# Patient Record
Sex: MALE | Race: WHITE | Employment: OTHER | URBAN - METROPOLITAN AREA
[De-identification: names, ages, dates, MRNs, and addresses within clinical notes are randomized per-mention and may not be internally consistent; named-entity substitution may affect disease eponyms.]

---

## 2017-02-08 ENCOUNTER — OFFICE VISIT (OUTPATIENT)
Dept: INTERNAL MEDICINE CLINIC | Age: 72
End: 2017-02-08

## 2017-02-08 VITALS
WEIGHT: 182.8 LBS | OXYGEN SATURATION: 98 % | TEMPERATURE: 98 F | SYSTOLIC BLOOD PRESSURE: 138 MMHG | HEART RATE: 51 BPM | BODY MASS INDEX: 27.71 KG/M2 | DIASTOLIC BLOOD PRESSURE: 78 MMHG | HEIGHT: 68 IN

## 2017-02-08 DIAGNOSIS — J40 BRONCHITIS: Primary | ICD-10-CM

## 2017-02-08 DIAGNOSIS — J44.9 CHRONIC OBSTRUCTIVE PULMONARY DISEASE, UNSPECIFIED COPD TYPE (HCC): ICD-10-CM

## 2017-02-08 PROCEDURE — G8926 SPIRO NO PERF OR DOC: HCPCS | Performed by: INTERNAL MEDICINE

## 2017-02-08 PROCEDURE — 1036F TOBACCO NON-USER: CPT | Performed by: INTERNAL MEDICINE

## 2017-02-08 PROCEDURE — 1123F ACP DISCUSS/DSCN MKR DOCD: CPT | Performed by: INTERNAL MEDICINE

## 2017-02-08 PROCEDURE — G8427 DOCREV CUR MEDS BY ELIG CLIN: HCPCS | Performed by: INTERNAL MEDICINE

## 2017-02-08 PROCEDURE — G8420 CALC BMI NORM PARAMETERS: HCPCS | Performed by: INTERNAL MEDICINE

## 2017-02-08 PROCEDURE — 99213 OFFICE O/P EST LOW 20 MIN: CPT | Performed by: INTERNAL MEDICINE

## 2017-02-08 PROCEDURE — 4040F PNEUMOC VAC/ADMIN/RCVD: CPT | Performed by: INTERNAL MEDICINE

## 2017-02-08 PROCEDURE — 3017F COLORECTAL CA SCREEN DOC REV: CPT | Performed by: INTERNAL MEDICINE

## 2017-02-08 PROCEDURE — G8484 FLU IMMUNIZE NO ADMIN: HCPCS | Performed by: INTERNAL MEDICINE

## 2017-02-08 PROCEDURE — G8598 ASA/ANTIPLAT THER USED: HCPCS | Performed by: INTERNAL MEDICINE

## 2017-02-08 PROCEDURE — 3023F SPIROM DOC REV: CPT | Performed by: INTERNAL MEDICINE

## 2017-02-08 RX ORDER — METHYLPREDNISOLONE 4 MG/1
TABLET ORAL
Qty: 1 KIT | Refills: 0 | Status: SHIPPED | OUTPATIENT
Start: 2017-02-08 | End: 2017-02-28

## 2017-02-08 RX ORDER — CEFUROXIME AXETIL 250 MG/1
250 TABLET ORAL 2 TIMES DAILY
Qty: 20 TABLET | Refills: 0 | Status: SHIPPED | OUTPATIENT
Start: 2017-02-08 | End: 2017-02-18

## 2017-02-08 ASSESSMENT — ENCOUNTER SYMPTOMS
SINUS PRESSURE: 1
COUGH: 1
EYES NEGATIVE: 1

## 2017-02-13 ENCOUNTER — TELEPHONE (OUTPATIENT)
Dept: INTERNAL MEDICINE CLINIC | Age: 72
End: 2017-02-13

## 2017-02-13 ENCOUNTER — OFFICE VISIT (OUTPATIENT)
Dept: INTERNAL MEDICINE CLINIC | Age: 72
End: 2017-02-13

## 2017-02-13 VITALS
BODY MASS INDEX: 26.37 KG/M2 | HEIGHT: 68 IN | WEIGHT: 174 LBS | TEMPERATURE: 97.9 F | OXYGEN SATURATION: 97 % | SYSTOLIC BLOOD PRESSURE: 130 MMHG | HEART RATE: 79 BPM | DIASTOLIC BLOOD PRESSURE: 80 MMHG

## 2017-02-13 DIAGNOSIS — I10 ESSENTIAL HYPERTENSION: ICD-10-CM

## 2017-02-13 DIAGNOSIS — R19.7 DIARRHEA, UNSPECIFIED TYPE: Primary | ICD-10-CM

## 2017-02-13 DIAGNOSIS — J40 BRONCHITIS: ICD-10-CM

## 2017-02-13 PROCEDURE — 3017F COLORECTAL CA SCREEN DOC REV: CPT | Performed by: INTERNAL MEDICINE

## 2017-02-13 PROCEDURE — G8598 ASA/ANTIPLAT THER USED: HCPCS | Performed by: INTERNAL MEDICINE

## 2017-02-13 PROCEDURE — G8427 DOCREV CUR MEDS BY ELIG CLIN: HCPCS | Performed by: INTERNAL MEDICINE

## 2017-02-13 PROCEDURE — 4040F PNEUMOC VAC/ADMIN/RCVD: CPT | Performed by: INTERNAL MEDICINE

## 2017-02-13 PROCEDURE — 1036F TOBACCO NON-USER: CPT | Performed by: INTERNAL MEDICINE

## 2017-02-13 PROCEDURE — G8484 FLU IMMUNIZE NO ADMIN: HCPCS | Performed by: INTERNAL MEDICINE

## 2017-02-13 PROCEDURE — G8420 CALC BMI NORM PARAMETERS: HCPCS | Performed by: INTERNAL MEDICINE

## 2017-02-13 PROCEDURE — 99213 OFFICE O/P EST LOW 20 MIN: CPT | Performed by: INTERNAL MEDICINE

## 2017-02-13 PROCEDURE — 1123F ACP DISCUSS/DSCN MKR DOCD: CPT | Performed by: INTERNAL MEDICINE

## 2017-02-13 ASSESSMENT — ENCOUNTER SYMPTOMS: COUGH: 1

## 2017-02-14 ENCOUNTER — TELEPHONE (OUTPATIENT)
Dept: INTERNAL MEDICINE CLINIC | Age: 72
End: 2017-02-14

## 2017-02-20 ENCOUNTER — CARE COORDINATION (OUTPATIENT)
Dept: INTERNAL MEDICINE CLINIC | Age: 72
End: 2017-02-20

## 2017-02-21 ENCOUNTER — TELEPHONE (OUTPATIENT)
Dept: INTERNAL MEDICINE CLINIC | Age: 72
End: 2017-02-21

## 2017-02-22 ENCOUNTER — CARE COORDINATION (OUTPATIENT)
Dept: INTERNAL MEDICINE CLINIC | Age: 72
End: 2017-02-22

## 2017-02-23 ENCOUNTER — CARE COORDINATION (OUTPATIENT)
Dept: INTERNAL MEDICINE CLINIC | Age: 72
End: 2017-02-23

## 2017-02-28 ENCOUNTER — OFFICE VISIT (OUTPATIENT)
Dept: INTERNAL MEDICINE CLINIC | Age: 72
End: 2017-02-28

## 2017-02-28 ENCOUNTER — HOSPITAL ENCOUNTER (OUTPATIENT)
Dept: VASCULAR LAB | Age: 72
Discharge: HOME | End: 2017-02-28
Attending: INTERNAL MEDICINE | Admitting: INTERNAL MEDICINE

## 2017-02-28 VITALS
HEIGHT: 68 IN | RESPIRATION RATE: 16 BRPM | SYSTOLIC BLOOD PRESSURE: 140 MMHG | HEART RATE: 62 BPM | DIASTOLIC BLOOD PRESSURE: 70 MMHG | WEIGHT: 181.2 LBS | BODY MASS INDEX: 27.46 KG/M2

## 2017-02-28 DIAGNOSIS — I82.812 SAPHENOUS VEIN CLOT, LEFT: ICD-10-CM

## 2017-02-28 DIAGNOSIS — M79.605 LEFT LEG PAIN: Primary | ICD-10-CM

## 2017-02-28 DIAGNOSIS — M79.605 PAIN OF LEFT LEG: ICD-10-CM

## 2017-02-28 LAB
BASOPHILS ABSOLUTE: 0 K/UL (ref 0–0.2)
BASOPHILS RELATIVE PERCENT: 0.6 %
EOSINOPHILS ABSOLUTE: 0.3 K/UL (ref 0–0.6)
EOSINOPHILS RELATIVE PERCENT: 3.9 %
HCT VFR BLD CALC: 41.2 % (ref 40.5–52.5)
HEMOGLOBIN: 13.7 G/DL (ref 13.5–17.5)
LYMPHOCYTES ABSOLUTE: 1.8 K/UL (ref 1–5.1)
LYMPHOCYTES RELATIVE PERCENT: 25.2 %
MCH RBC QN AUTO: 30.1 PG (ref 26–34)
MCHC RBC AUTO-ENTMCNC: 33.2 G/DL (ref 31–36)
MCV RBC AUTO: 90.7 FL (ref 80–100)
MONOCYTES ABSOLUTE: 0.5 K/UL (ref 0–1.3)
MONOCYTES RELATIVE PERCENT: 7.6 %
NEUTROPHILS ABSOLUTE: 4.5 K/UL (ref 1.7–7.7)
NEUTROPHILS RELATIVE PERCENT: 62.7 %
PDW BLD-RTO: 13.6 % (ref 12.4–15.4)
PLATELET # BLD: 216 K/UL (ref 135–450)
PMV BLD AUTO: 9.3 FL (ref 5–10.5)
RBC # BLD: 4.55 M/UL (ref 4.2–5.9)
WBC # BLD: 7.2 K/UL (ref 4–11)

## 2017-02-28 PROCEDURE — G8420 CALC BMI NORM PARAMETERS: HCPCS | Performed by: INTERNAL MEDICINE

## 2017-02-28 PROCEDURE — 1036F TOBACCO NON-USER: CPT | Performed by: INTERNAL MEDICINE

## 2017-02-28 PROCEDURE — G8598 ASA/ANTIPLAT THER USED: HCPCS | Performed by: INTERNAL MEDICINE

## 2017-02-28 PROCEDURE — 99214 OFFICE O/P EST MOD 30 MIN: CPT | Performed by: INTERNAL MEDICINE

## 2017-02-28 PROCEDURE — G8484 FLU IMMUNIZE NO ADMIN: HCPCS | Performed by: INTERNAL MEDICINE

## 2017-02-28 PROCEDURE — G8427 DOCREV CUR MEDS BY ELIG CLIN: HCPCS | Performed by: INTERNAL MEDICINE

## 2017-02-28 PROCEDURE — 1123F ACP DISCUSS/DSCN MKR DOCD: CPT | Performed by: INTERNAL MEDICINE

## 2017-02-28 PROCEDURE — 3017F COLORECTAL CA SCREEN DOC REV: CPT | Performed by: INTERNAL MEDICINE

## 2017-02-28 PROCEDURE — 4040F PNEUMOC VAC/ADMIN/RCVD: CPT | Performed by: INTERNAL MEDICINE

## 2017-02-28 ASSESSMENT — PATIENT HEALTH QUESTIONNAIRE - PHQ9
SUM OF ALL RESPONSES TO PHQ9 QUESTIONS 1 & 2: 0
1. LITTLE INTEREST OR PLEASURE IN DOING THINGS: 0
2. FEELING DOWN, DEPRESSED OR HOPELESS: 0
SUM OF ALL RESPONSES TO PHQ QUESTIONS 1-9: 0

## 2017-03-01 LAB
A/G RATIO: 1.6 (ref 1.1–2.2)
ALBUMIN SERPL-MCNC: 4 G/DL (ref 3.4–5)
ALP BLD-CCNC: 117 U/L (ref 40–129)
ALT SERPL-CCNC: 21 U/L (ref 10–40)
ANION GAP SERPL CALCULATED.3IONS-SCNC: 16 MMOL/L (ref 3–16)
AST SERPL-CCNC: 15 U/L (ref 15–37)
BILIRUB SERPL-MCNC: 0.6 MG/DL (ref 0–1)
BUN BLDV-MCNC: 11 MG/DL (ref 7–20)
CALCIUM SERPL-MCNC: 9.5 MG/DL (ref 8.3–10.6)
CHLORIDE BLD-SCNC: 102 MMOL/L (ref 99–110)
CO2: 24 MMOL/L (ref 21–32)
CREAT SERPL-MCNC: 0.6 MG/DL (ref 0.8–1.3)
GFR AFRICAN AMERICAN: >60
GFR NON-AFRICAN AMERICAN: >60
GLOBULIN: 2.5 G/DL
GLUCOSE BLD-MCNC: 93 MG/DL (ref 70–99)
POTASSIUM SERPL-SCNC: 4.9 MMOL/L (ref 3.5–5.1)
SODIUM BLD-SCNC: 142 MMOL/L (ref 136–145)
TOTAL PROTEIN: 6.5 G/DL (ref 6.4–8.2)

## 2017-03-24 ENCOUNTER — OFFICE VISIT (OUTPATIENT)
Dept: INTERNAL MEDICINE CLINIC | Age: 72
End: 2017-03-24

## 2017-03-24 VITALS
SYSTOLIC BLOOD PRESSURE: 110 MMHG | DIASTOLIC BLOOD PRESSURE: 60 MMHG | BODY MASS INDEX: 27.89 KG/M2 | HEIGHT: 68 IN | WEIGHT: 184 LBS | HEART RATE: 52 BPM | RESPIRATION RATE: 16 BRPM

## 2017-03-24 DIAGNOSIS — J44.9 CHRONIC OBSTRUCTIVE PULMONARY DISEASE, UNSPECIFIED COPD TYPE (HCC): ICD-10-CM

## 2017-03-24 DIAGNOSIS — I82.90 VENOUS THROMBOSIS OF LEG: ICD-10-CM

## 2017-03-24 DIAGNOSIS — I10 ESSENTIAL HYPERTENSION: ICD-10-CM

## 2017-03-24 DIAGNOSIS — H26.9 RIGHT CATARACT: Primary | ICD-10-CM

## 2017-03-24 DIAGNOSIS — I25.10 CORONARY ARTERY DISEASE INVOLVING NATIVE CORONARY ARTERY OF NATIVE HEART WITHOUT ANGINA PECTORIS: ICD-10-CM

## 2017-03-24 DIAGNOSIS — E78.00 PURE HYPERCHOLESTEROLEMIA: ICD-10-CM

## 2017-03-24 DIAGNOSIS — Z01.818 PREOP EXAMINATION: ICD-10-CM

## 2017-03-24 PROCEDURE — 1036F TOBACCO NON-USER: CPT | Performed by: INTERNAL MEDICINE

## 2017-03-24 PROCEDURE — 93000 ELECTROCARDIOGRAM COMPLETE: CPT | Performed by: INTERNAL MEDICINE

## 2017-03-24 PROCEDURE — 1123F ACP DISCUSS/DSCN MKR DOCD: CPT | Performed by: INTERNAL MEDICINE

## 2017-03-24 PROCEDURE — G8427 DOCREV CUR MEDS BY ELIG CLIN: HCPCS | Performed by: INTERNAL MEDICINE

## 2017-03-24 PROCEDURE — G8484 FLU IMMUNIZE NO ADMIN: HCPCS | Performed by: INTERNAL MEDICINE

## 2017-03-24 PROCEDURE — 99214 OFFICE O/P EST MOD 30 MIN: CPT | Performed by: INTERNAL MEDICINE

## 2017-03-24 PROCEDURE — G8598 ASA/ANTIPLAT THER USED: HCPCS | Performed by: INTERNAL MEDICINE

## 2017-03-24 PROCEDURE — G8420 CALC BMI NORM PARAMETERS: HCPCS | Performed by: INTERNAL MEDICINE

## 2017-03-24 PROCEDURE — G8926 SPIRO NO PERF OR DOC: HCPCS | Performed by: INTERNAL MEDICINE

## 2017-03-24 PROCEDURE — 3017F COLORECTAL CA SCREEN DOC REV: CPT | Performed by: INTERNAL MEDICINE

## 2017-03-24 PROCEDURE — 4040F PNEUMOC VAC/ADMIN/RCVD: CPT | Performed by: INTERNAL MEDICINE

## 2017-03-24 PROCEDURE — 3023F SPIROM DOC REV: CPT | Performed by: INTERNAL MEDICINE

## 2017-03-24 ASSESSMENT — ENCOUNTER SYMPTOMS
GASTROINTESTINAL NEGATIVE: 1
SHORTNESS OF BREATH: 0
RESPIRATORY NEGATIVE: 1
WHEEZING: 0
EYE PAIN: 0
PHOTOPHOBIA: 0
BLOOD IN STOOL: 0

## 2017-04-24 ENCOUNTER — OFFICE VISIT (OUTPATIENT)
Dept: INTERNAL MEDICINE CLINIC | Age: 72
End: 2017-04-24

## 2017-04-24 VITALS
RESPIRATION RATE: 16 BRPM | SYSTOLIC BLOOD PRESSURE: 118 MMHG | BODY MASS INDEX: 28.01 KG/M2 | HEIGHT: 68 IN | WEIGHT: 184.8 LBS | HEART RATE: 52 BPM | DIASTOLIC BLOOD PRESSURE: 70 MMHG

## 2017-04-24 DIAGNOSIS — I10 ESSENTIAL HYPERTENSION: ICD-10-CM

## 2017-04-24 DIAGNOSIS — I25.10 CORONARY ARTERY DISEASE INVOLVING NATIVE CORONARY ARTERY OF NATIVE HEART WITHOUT ANGINA PECTORIS: ICD-10-CM

## 2017-04-24 DIAGNOSIS — Z01.818 PREOP EXAM FOR INTERNAL MEDICINE: ICD-10-CM

## 2017-04-24 DIAGNOSIS — I82.812 SAPHENOUS VEIN CLOT, LEFT: ICD-10-CM

## 2017-04-24 DIAGNOSIS — H26.9 RIGHT CATARACT: Primary | ICD-10-CM

## 2017-04-24 LAB
BASOPHILS ABSOLUTE: 0 K/UL (ref 0–0.2)
BASOPHILS RELATIVE PERCENT: 0.8 %
EOSINOPHILS ABSOLUTE: 0.3 K/UL (ref 0–0.6)
EOSINOPHILS RELATIVE PERCENT: 4.6 %
HCT VFR BLD CALC: 41 % (ref 40.5–52.5)
HEMOGLOBIN: 13.6 G/DL (ref 13.5–17.5)
LYMPHOCYTES ABSOLUTE: 1.9 K/UL (ref 1–5.1)
LYMPHOCYTES RELATIVE PERCENT: 29.7 %
MCH RBC QN AUTO: 30 PG (ref 26–34)
MCHC RBC AUTO-ENTMCNC: 33 G/DL (ref 31–36)
MCV RBC AUTO: 90.6 FL (ref 80–100)
MONOCYTES ABSOLUTE: 0.4 K/UL (ref 0–1.3)
MONOCYTES RELATIVE PERCENT: 6.5 %
NEUTROPHILS ABSOLUTE: 3.6 K/UL (ref 1.7–7.7)
NEUTROPHILS RELATIVE PERCENT: 58.4 %
PDW BLD-RTO: 14.5 % (ref 12.4–15.4)
PLATELET # BLD: 163 K/UL (ref 135–450)
PMV BLD AUTO: 9.8 FL (ref 5–10.5)
RBC # BLD: 4.52 M/UL (ref 4.2–5.9)
WBC # BLD: 6.2 K/UL (ref 4–11)

## 2017-04-24 PROCEDURE — 4040F PNEUMOC VAC/ADMIN/RCVD: CPT | Performed by: INTERNAL MEDICINE

## 2017-04-24 PROCEDURE — G8427 DOCREV CUR MEDS BY ELIG CLIN: HCPCS | Performed by: INTERNAL MEDICINE

## 2017-04-24 PROCEDURE — G8598 ASA/ANTIPLAT THER USED: HCPCS | Performed by: INTERNAL MEDICINE

## 2017-04-24 PROCEDURE — G8420 CALC BMI NORM PARAMETERS: HCPCS | Performed by: INTERNAL MEDICINE

## 2017-04-24 PROCEDURE — 1123F ACP DISCUSS/DSCN MKR DOCD: CPT | Performed by: INTERNAL MEDICINE

## 2017-04-24 PROCEDURE — 1036F TOBACCO NON-USER: CPT | Performed by: INTERNAL MEDICINE

## 2017-04-24 PROCEDURE — 3017F COLORECTAL CA SCREEN DOC REV: CPT | Performed by: INTERNAL MEDICINE

## 2017-04-24 PROCEDURE — 99214 OFFICE O/P EST MOD 30 MIN: CPT | Performed by: INTERNAL MEDICINE

## 2017-05-01 RX ORDER — METOPROLOL SUCCINATE 50 MG/1
TABLET, EXTENDED RELEASE ORAL
Qty: 90 TABLET | Refills: 2 | Status: SHIPPED | OUTPATIENT
Start: 2017-05-01 | End: 2018-01-27 | Stop reason: SDUPTHER

## 2017-06-26 ENCOUNTER — OFFICE VISIT (OUTPATIENT)
Dept: INTERNAL MEDICINE CLINIC | Age: 72
End: 2017-06-26

## 2017-06-26 VITALS
HEART RATE: 72 BPM | SYSTOLIC BLOOD PRESSURE: 120 MMHG | RESPIRATION RATE: 16 BRPM | HEIGHT: 68 IN | BODY MASS INDEX: 27.13 KG/M2 | WEIGHT: 179 LBS | DIASTOLIC BLOOD PRESSURE: 70 MMHG

## 2017-06-26 DIAGNOSIS — I82.812 SAPHENOUS VEIN CLOT, LEFT: Primary | ICD-10-CM

## 2017-06-26 PROCEDURE — 1123F ACP DISCUSS/DSCN MKR DOCD: CPT | Performed by: INTERNAL MEDICINE

## 2017-06-26 PROCEDURE — 1036F TOBACCO NON-USER: CPT | Performed by: INTERNAL MEDICINE

## 2017-06-26 PROCEDURE — G8419 CALC BMI OUT NRM PARAM NOF/U: HCPCS | Performed by: INTERNAL MEDICINE

## 2017-06-26 PROCEDURE — 4040F PNEUMOC VAC/ADMIN/RCVD: CPT | Performed by: INTERNAL MEDICINE

## 2017-06-26 PROCEDURE — G8427 DOCREV CUR MEDS BY ELIG CLIN: HCPCS | Performed by: INTERNAL MEDICINE

## 2017-06-26 PROCEDURE — 99213 OFFICE O/P EST LOW 20 MIN: CPT | Performed by: INTERNAL MEDICINE

## 2017-06-26 PROCEDURE — 3017F COLORECTAL CA SCREEN DOC REV: CPT | Performed by: INTERNAL MEDICINE

## 2017-06-26 PROCEDURE — G8598 ASA/ANTIPLAT THER USED: HCPCS | Performed by: INTERNAL MEDICINE

## 2017-06-26 RX ORDER — SILDENAFIL 100 MG/1
TABLET, FILM COATED ORAL
Qty: 10 TABLET | Refills: 10 | Status: SHIPPED | OUTPATIENT
Start: 2017-06-26 | End: 2018-04-12

## 2017-10-10 ENCOUNTER — OFFICE VISIT (OUTPATIENT)
Dept: INTERNAL MEDICINE CLINIC | Age: 72
End: 2017-10-10

## 2017-10-10 VITALS
DIASTOLIC BLOOD PRESSURE: 70 MMHG | SYSTOLIC BLOOD PRESSURE: 110 MMHG | WEIGHT: 178 LBS | RESPIRATION RATE: 16 BRPM | HEART RATE: 58 BPM | HEIGHT: 68 IN | BODY MASS INDEX: 26.98 KG/M2

## 2017-10-10 DIAGNOSIS — E78.00 PURE HYPERCHOLESTEROLEMIA: ICD-10-CM

## 2017-10-10 DIAGNOSIS — R73.9 HYPERGLYCEMIA: ICD-10-CM

## 2017-10-10 DIAGNOSIS — I25.10 CORONARY ARTERY DISEASE INVOLVING NATIVE CORONARY ARTERY OF NATIVE HEART WITHOUT ANGINA PECTORIS: ICD-10-CM

## 2017-10-10 DIAGNOSIS — J44.1 CHRONIC OBSTRUCTIVE PULMONARY DISEASE WITH ACUTE EXACERBATION (HCC): ICD-10-CM

## 2017-10-10 DIAGNOSIS — I10 ESSENTIAL HYPERTENSION: Primary | ICD-10-CM

## 2017-10-10 PROCEDURE — 3017F COLORECTAL CA SCREEN DOC REV: CPT | Performed by: INTERNAL MEDICINE

## 2017-10-10 PROCEDURE — 3023F SPIROM DOC REV: CPT | Performed by: INTERNAL MEDICINE

## 2017-10-10 PROCEDURE — 4040F PNEUMOC VAC/ADMIN/RCVD: CPT | Performed by: INTERNAL MEDICINE

## 2017-10-10 PROCEDURE — 99214 OFFICE O/P EST MOD 30 MIN: CPT | Performed by: INTERNAL MEDICINE

## 2017-10-10 PROCEDURE — 1036F TOBACCO NON-USER: CPT | Performed by: INTERNAL MEDICINE

## 2017-10-10 PROCEDURE — G8598 ASA/ANTIPLAT THER USED: HCPCS | Performed by: INTERNAL MEDICINE

## 2017-10-10 PROCEDURE — G8427 DOCREV CUR MEDS BY ELIG CLIN: HCPCS | Performed by: INTERNAL MEDICINE

## 2017-10-10 PROCEDURE — G8484 FLU IMMUNIZE NO ADMIN: HCPCS | Performed by: INTERNAL MEDICINE

## 2017-10-10 PROCEDURE — 1123F ACP DISCUSS/DSCN MKR DOCD: CPT | Performed by: INTERNAL MEDICINE

## 2017-10-10 PROCEDURE — G8417 CALC BMI ABV UP PARAM F/U: HCPCS | Performed by: INTERNAL MEDICINE

## 2017-10-10 PROCEDURE — G8926 SPIRO NO PERF OR DOC: HCPCS | Performed by: INTERNAL MEDICINE

## 2017-10-10 RX ORDER — PREDNISONE 10 MG/1
TABLET ORAL
Qty: 22 TABLET | Refills: 0 | Status: SHIPPED | OUTPATIENT
Start: 2017-10-10 | End: 2018-04-12

## 2017-10-10 RX ORDER — ALBUTEROL SULFATE 90 UG/1
2 AEROSOL, METERED RESPIRATORY (INHALATION) EVERY 4 HOURS PRN
Qty: 1 INHALER | Refills: 1 | Status: SHIPPED | OUTPATIENT
Start: 2017-10-10 | End: 2018-04-12

## 2017-10-10 RX ORDER — DOXYCYCLINE HYCLATE 100 MG
100 TABLET ORAL 2 TIMES DAILY
Qty: 14 TABLET | Refills: 0 | Status: SHIPPED | OUTPATIENT
Start: 2017-10-10 | End: 2017-10-17

## 2017-10-10 ASSESSMENT — ENCOUNTER SYMPTOMS
WHEEZING: 1
SHORTNESS OF BREATH: 1
COUGH: 1
GASTROINTESTINAL NEGATIVE: 1

## 2017-10-10 NOTE — PROGRESS NOTES
Subjective:      Patient ID: Vanita Goodwin is a 81 y.o. male. HPI  Here for routine f/u of his htn and CAD. He has overall been doing well, but never had the doppler of his leg to reevaluate the saphenous vein clot. Has developed a cough with wheezing in the last week, may have had  A fever last night. 1. Essential hypertension  - Denies chest pain or shortness of breath. Denies PND or orthopnea. No lower extremity edema noted. 2. Coronary artery disease involving native coronary artery of native heart without angina pectoris  -asymptomatic. 3. Chronic obstructive pulmonary disease with acute exacerbation (HCC)  - current wheezing, sputum and increased SOB   4. Pure hypercholesterolemia  -Tolerating medications well. Denies myalgias or muscle weakness. 5. Hyperglycemia  -Denies polyuria, polydipsia, or polyphagia. Review of Systems   Respiratory: Positive for cough, shortness of breath and wheezing. Cardiovascular: Negative. Gastrointestinal: Negative. Genitourinary: Negative. All other systems reviewed and are negative. Current Outpatient Prescriptions   Medication Sig Dispense Refill    sildenafil (VIAGRA) 100 MG tablet TAKE ONE TABLET BY MOUTH EVERY DAY AS NEEDED FOR ERECTILE DYSFUNCTION 10 tablet 10    metoprolol succinate (TOPROL XL) 50 MG extended release tablet TAKE 1 TABLET DAILY 90 tablet 2    aspirin 81 MG tablet Take 81 mg by mouth daily      atorvastatin (LIPITOR) 40 MG tablet TAKE 1 TABLET EVERY EVENING 90 tablet 3     No current facility-administered medications for this visit. Allergies:  Review of patient's allergies indicates no known allergies.       Past Medical History:   Diagnosis Date    C. difficile colitis 02/2017    cefuroxime    CAD (coronary artery disease) 2002    Stent to LAD Barrington L[place])    COPD (chronic obstructive pulmonary disease) (HCC)     GERD (gastroesophageal reflux disease)     Hyperlipidemia     Hypertension     Venous

## 2017-10-16 ENCOUNTER — HOSPITAL ENCOUNTER (OUTPATIENT)
Dept: VASCULAR LAB | Age: 72
Discharge: HOME | End: 2017-10-16
Attending: INTERNAL MEDICINE | Admitting: INTERNAL MEDICINE

## 2017-10-16 DIAGNOSIS — I82.812 EMBOLISM AND THROMBOSIS OF SUPERFICIAL VEIN OF LEFT LOWER EXTREMITY: ICD-10-CM

## 2017-10-16 DIAGNOSIS — I82.812 SAPHENOUS VEIN CLOT, LEFT: ICD-10-CM

## 2017-10-30 RX ORDER — ATORVASTATIN CALCIUM 40 MG/1
TABLET, FILM COATED ORAL
Qty: 90 TABLET | Refills: 3 | Status: SHIPPED | OUTPATIENT
Start: 2017-10-30 | End: 2019-01-22 | Stop reason: SDUPTHER

## 2018-01-28 RX ORDER — METOPROLOL SUCCINATE 50 MG/1
TABLET, EXTENDED RELEASE ORAL
Qty: 90 TABLET | Refills: 2 | Status: SHIPPED | OUTPATIENT
Start: 2018-01-28 | End: 2018-10-24 | Stop reason: SDUPTHER

## 2018-04-12 ENCOUNTER — OFFICE VISIT (OUTPATIENT)
Dept: INTERNAL MEDICINE CLINIC | Age: 73
End: 2018-04-12

## 2018-04-12 VITALS
HEART RATE: 60 BPM | DIASTOLIC BLOOD PRESSURE: 72 MMHG | WEIGHT: 184.6 LBS | HEIGHT: 68 IN | BODY MASS INDEX: 27.98 KG/M2 | RESPIRATION RATE: 16 BRPM | SYSTOLIC BLOOD PRESSURE: 138 MMHG

## 2018-04-12 DIAGNOSIS — K21.9 GASTROESOPHAGEAL REFLUX DISEASE WITHOUT ESOPHAGITIS: ICD-10-CM

## 2018-04-12 DIAGNOSIS — J44.1 CHRONIC OBSTRUCTIVE PULMONARY DISEASE WITH ACUTE EXACERBATION (HCC): ICD-10-CM

## 2018-04-12 DIAGNOSIS — I25.10 CORONARY ARTERY DISEASE INVOLVING NATIVE CORONARY ARTERY OF NATIVE HEART WITHOUT ANGINA PECTORIS: ICD-10-CM

## 2018-04-12 DIAGNOSIS — I10 ESSENTIAL HYPERTENSION: Primary | ICD-10-CM

## 2018-04-12 DIAGNOSIS — R73.9 HYPERGLYCEMIA: ICD-10-CM

## 2018-04-12 LAB
A/G RATIO: 2.1 (ref 1.1–2.2)
ALBUMIN SERPL-MCNC: 4.4 G/DL (ref 3.4–5)
ALP BLD-CCNC: 86 U/L (ref 40–129)
ALT SERPL-CCNC: 28 U/L (ref 10–40)
ANION GAP SERPL CALCULATED.3IONS-SCNC: 12 MMOL/L (ref 3–16)
AST SERPL-CCNC: 19 U/L (ref 15–37)
BASOPHILS ABSOLUTE: 0 K/UL (ref 0–0.2)
BASOPHILS RELATIVE PERCENT: 0.7 %
BILIRUB SERPL-MCNC: 0.5 MG/DL (ref 0–1)
BUN BLDV-MCNC: 14 MG/DL (ref 7–20)
CALCIUM SERPL-MCNC: 9.3 MG/DL (ref 8.3–10.6)
CHLORIDE BLD-SCNC: 103 MMOL/L (ref 99–110)
CHOLESTEROL, TOTAL: 190 MG/DL (ref 0–199)
CO2: 27 MMOL/L (ref 21–32)
CREAT SERPL-MCNC: 0.7 MG/DL (ref 0.8–1.3)
EOSINOPHILS ABSOLUTE: 0.3 K/UL (ref 0–0.6)
EOSINOPHILS RELATIVE PERCENT: 4.4 %
GFR AFRICAN AMERICAN: >60
GFR NON-AFRICAN AMERICAN: >60
GLOBULIN: 2.1 G/DL
GLUCOSE BLD-MCNC: 113 MG/DL (ref 70–99)
HCT VFR BLD CALC: 43.3 % (ref 40.5–52.5)
HDLC SERPL-MCNC: 63 MG/DL (ref 40–60)
HEMOGLOBIN: 14.4 G/DL (ref 13.5–17.5)
LDL CHOLESTEROL CALCULATED: 107 MG/DL
LYMPHOCYTES ABSOLUTE: 1.6 K/UL (ref 1–5.1)
LYMPHOCYTES RELATIVE PERCENT: 25.3 %
MCH RBC QN AUTO: 30.8 PG (ref 26–34)
MCHC RBC AUTO-ENTMCNC: 33.3 G/DL (ref 31–36)
MCV RBC AUTO: 92.4 FL (ref 80–100)
MONOCYTES ABSOLUTE: 0.4 K/UL (ref 0–1.3)
MONOCYTES RELATIVE PERCENT: 7.2 %
NEUTROPHILS ABSOLUTE: 3.9 K/UL (ref 1.7–7.7)
NEUTROPHILS RELATIVE PERCENT: 62.4 %
PDW BLD-RTO: 14.2 % (ref 12.4–15.4)
PLATELET # BLD: 199 K/UL (ref 135–450)
PMV BLD AUTO: 9.3 FL (ref 5–10.5)
POTASSIUM SERPL-SCNC: 4.9 MMOL/L (ref 3.5–5.1)
RBC # BLD: 4.69 M/UL (ref 4.2–5.9)
SODIUM BLD-SCNC: 142 MMOL/L (ref 136–145)
TOTAL PROTEIN: 6.5 G/DL (ref 6.4–8.2)
TRIGL SERPL-MCNC: 98 MG/DL (ref 0–150)
VLDLC SERPL CALC-MCNC: 20 MG/DL
WBC # BLD: 6.2 K/UL (ref 4–11)

## 2018-04-12 PROCEDURE — 1123F ACP DISCUSS/DSCN MKR DOCD: CPT | Performed by: INTERNAL MEDICINE

## 2018-04-12 PROCEDURE — 1036F TOBACCO NON-USER: CPT | Performed by: INTERNAL MEDICINE

## 2018-04-12 PROCEDURE — G8598 ASA/ANTIPLAT THER USED: HCPCS | Performed by: INTERNAL MEDICINE

## 2018-04-12 PROCEDURE — G8427 DOCREV CUR MEDS BY ELIG CLIN: HCPCS | Performed by: INTERNAL MEDICINE

## 2018-04-12 PROCEDURE — 4040F PNEUMOC VAC/ADMIN/RCVD: CPT | Performed by: INTERNAL MEDICINE

## 2018-04-12 PROCEDURE — 99214 OFFICE O/P EST MOD 30 MIN: CPT | Performed by: INTERNAL MEDICINE

## 2018-04-12 PROCEDURE — 3023F SPIROM DOC REV: CPT | Performed by: INTERNAL MEDICINE

## 2018-04-12 PROCEDURE — G8419 CALC BMI OUT NRM PARAM NOF/U: HCPCS | Performed by: INTERNAL MEDICINE

## 2018-04-12 PROCEDURE — G8926 SPIRO NO PERF OR DOC: HCPCS | Performed by: INTERNAL MEDICINE

## 2018-04-12 PROCEDURE — 3017F COLORECTAL CA SCREEN DOC REV: CPT | Performed by: INTERNAL MEDICINE

## 2018-04-12 ASSESSMENT — PATIENT HEALTH QUESTIONNAIRE - PHQ9
SUM OF ALL RESPONSES TO PHQ QUESTIONS 1-9: 0
1. LITTLE INTEREST OR PLEASURE IN DOING THINGS: 0
2. FEELING DOWN, DEPRESSED OR HOPELESS: 0
SUM OF ALL RESPONSES TO PHQ9 QUESTIONS 1 & 2: 0

## 2018-04-12 ASSESSMENT — ENCOUNTER SYMPTOMS
WHEEZING: 0
RESPIRATORY NEGATIVE: 1
SHORTNESS OF BREATH: 0
STRIDOR: 0

## 2018-04-13 ENCOUNTER — TELEPHONE (OUTPATIENT)
Dept: INTERNAL MEDICINE CLINIC | Age: 73
End: 2018-04-13

## 2018-05-29 ENCOUNTER — HOSPITAL ENCOUNTER (OUTPATIENT)
Dept: VASCULAR LAB | Age: 73
Discharge: HOME | End: 2018-05-29
Attending: INTERNAL MEDICINE | Admitting: INTERNAL MEDICINE

## 2018-05-29 ENCOUNTER — OFFICE VISIT (OUTPATIENT)
Dept: INTERNAL MEDICINE CLINIC | Age: 73
End: 2018-05-29

## 2018-05-29 VITALS
BODY MASS INDEX: 27.46 KG/M2 | RESPIRATION RATE: 16 BRPM | HEIGHT: 68 IN | DIASTOLIC BLOOD PRESSURE: 60 MMHG | SYSTOLIC BLOOD PRESSURE: 120 MMHG | WEIGHT: 181.2 LBS | HEART RATE: 64 BPM

## 2018-05-29 DIAGNOSIS — M79.89 LEFT LEG SWELLING: Primary | ICD-10-CM

## 2018-05-29 DIAGNOSIS — M79.89 OTHER SPECIFIED SOFT TISSUE DISORDERS (CODE): ICD-10-CM

## 2018-05-29 PROCEDURE — 4040F PNEUMOC VAC/ADMIN/RCVD: CPT | Performed by: INTERNAL MEDICINE

## 2018-05-29 PROCEDURE — 3017F COLORECTAL CA SCREEN DOC REV: CPT | Performed by: INTERNAL MEDICINE

## 2018-05-29 PROCEDURE — 1123F ACP DISCUSS/DSCN MKR DOCD: CPT | Performed by: INTERNAL MEDICINE

## 2018-05-29 PROCEDURE — G8417 CALC BMI ABV UP PARAM F/U: HCPCS | Performed by: INTERNAL MEDICINE

## 2018-05-29 PROCEDURE — G8427 DOCREV CUR MEDS BY ELIG CLIN: HCPCS | Performed by: INTERNAL MEDICINE

## 2018-05-29 PROCEDURE — 1036F TOBACCO NON-USER: CPT | Performed by: INTERNAL MEDICINE

## 2018-05-29 PROCEDURE — 99213 OFFICE O/P EST LOW 20 MIN: CPT | Performed by: INTERNAL MEDICINE

## 2018-05-29 PROCEDURE — G8598 ASA/ANTIPLAT THER USED: HCPCS | Performed by: INTERNAL MEDICINE

## 2018-07-03 ENCOUNTER — TELEPHONE (OUTPATIENT)
Dept: INTERNAL MEDICINE CLINIC | Age: 73
End: 2018-07-03

## 2018-07-03 ENCOUNTER — TELEPHONE (OUTPATIENT)
Dept: CARDIOLOGY CLINIC | Age: 73
End: 2018-07-03

## 2018-07-03 NOTE — TELEPHONE ENCOUNTER
This patient needs an MRI of the left ankle but he has stents . Please fax something saying he is ok to have a MRI with the stents that he has to fax [phone].

## 2018-12-28 ENCOUNTER — TELEPHONE (OUTPATIENT)
Dept: CARDIOLOGY CLINIC | Age: 73
End: 2018-12-28

## 2018-12-28 NOTE — TELEPHONE ENCOUNTER
Spoke with Douglas Briggs RN- Patient is to FU with PCP since we have not seen him in 3 years. He would be considered a new patient. If she is very concerned patient may go to ED.

## 2020-06-23 ENCOUNTER — OFFICE VISIT (OUTPATIENT)
Dept: PRIMARY CARE CLINIC | Age: 75
End: 2020-06-23
Payer: MEDICARE

## 2020-06-23 PROCEDURE — G8417 CALC BMI ABV UP PARAM F/U: HCPCS | Performed by: NURSE PRACTITIONER

## 2020-06-23 PROCEDURE — G8428 CUR MEDS NOT DOCUMENT: HCPCS | Performed by: NURSE PRACTITIONER

## 2020-06-23 PROCEDURE — 99211 OFF/OP EST MAY X REQ PHY/QHP: CPT | Performed by: NURSE PRACTITIONER

## 2020-06-26 LAB
SARS-COV-2: NOT DETECTED
SOURCE: NORMAL

## 2020-06-29 ENCOUNTER — HOSPITAL ENCOUNTER (OUTPATIENT)
Dept: PULMONOLOGY | Age: 75
Discharge: HOME | End: 2020-06-29
Payer: MEDICARE

## 2020-06-29 ENCOUNTER — HOSPITAL ENCOUNTER (OUTPATIENT)
Dept: GENERAL RADIOLOGY | Age: 75
Discharge: HOME | End: 2020-06-29
Payer: MEDICARE

## 2020-06-29 PROCEDURE — 94664 DEMO&/EVAL PT USE INHALER: CPT

## 2020-06-29 PROCEDURE — 6360000002 HC RX W HCPCS: Performed by: INTERNAL MEDICINE

## 2020-06-29 PROCEDURE — 94729 DIFFUSING CAPACITY: CPT

## 2020-06-29 PROCEDURE — 71046 X-RAY EXAM CHEST 2 VIEWS: CPT

## 2020-06-29 PROCEDURE — 94726 PLETHYSMOGRAPHY LUNG VOLUMES: CPT

## 2020-06-29 PROCEDURE — 94060 EVALUATION OF WHEEZING: CPT

## 2020-06-29 PROCEDURE — 94760 N-INVAS EAR/PLS OXIMETRY 1: CPT

## 2020-06-29 RX ORDER — ALBUTEROL SULFATE 2.5 MG/3ML
2.5 SOLUTION RESPIRATORY (INHALATION) ONCE
Status: COMPLETED | OUTPATIENT
Start: 2020-06-29 | End: 2020-06-29

## 2020-06-29 RX ADMIN — ALBUTEROL SULFATE 2.5 MG: 2.5 SOLUTION RESPIRATORY (INHALATION) at 17:42

## 2020-07-01 ENCOUNTER — HOSPITAL ENCOUNTER (OUTPATIENT)
Age: 75
Discharge: HOME | End: 2020-07-01
Payer: MEDICARE

## 2020-07-01 ENCOUNTER — HOSPITAL ENCOUNTER (OUTPATIENT)
Dept: GENERAL RADIOLOGY | Age: 75
Discharge: HOME | End: 2020-07-01
Payer: MEDICARE

## 2020-07-01 PROCEDURE — 71046 X-RAY EXAM CHEST 2 VIEWS: CPT

## 2020-08-14 ENCOUNTER — OFFICE VISIT (OUTPATIENT)
Dept: PRIMARY CARE CLINIC | Age: 75
End: 2020-08-14
Payer: MEDICARE

## 2020-08-14 PROCEDURE — G8417 CALC BMI ABV UP PARAM F/U: HCPCS | Performed by: NURSE PRACTITIONER

## 2020-08-14 PROCEDURE — G8428 CUR MEDS NOT DOCUMENT: HCPCS | Performed by: NURSE PRACTITIONER

## 2020-08-14 PROCEDURE — 99211 OFF/OP EST MAY X REQ PHY/QHP: CPT | Performed by: NURSE PRACTITIONER

## 2020-08-14 NOTE — PROGRESS NOTES
Cristina Folds received a viral test for COVID-19. They were educated on isolation and quarantine as appropriate. For any symptoms, they were directed to seek care from their PCP, given contact information to establish with a doctor, directed to an urgent care or the emergency room.

## 2020-08-14 NOTE — PATIENT INSTRUCTIONS

## 2020-08-15 LAB — SARS-COV-2, NAA: DETECTED

## 2020-08-18 ENCOUNTER — NURSE TRIAGE (OUTPATIENT)
Dept: OTHER | Facility: CLINIC | Age: 75
End: 2020-08-18

## 2020-08-18 NOTE — TELEPHONE ENCOUNTER
Reason for Disposition   Caller requesting a NON-URGENT new prescription or refill and triager unable to refill per department policy    Answer Assessment - Initial Assessment Questions  1. SYMPTOMS: \"Do you have any symptoms? \"      Back pain   2. SEVERITY: If symptoms are present, ask \"Are they mild, moderate or severe? \"      Questions if should take hyroxychloroquine    Protocols used: MEDICATION QUESTION CALL-ADULT-[place]    Pt states been sick for a week positive covid he is wheezing asking if steroids or antibiotics would help him   Pt has COPD   Pt informed to not take hydroxychloroquine as its his daughters prescription

## 2020-09-28 ENCOUNTER — NURSE TRIAGE (OUTPATIENT)
Dept: OTHER | Facility: CLINIC | Age: 75
End: 2020-09-28

## 2020-09-28 NOTE — TELEPHONE ENCOUNTER
Pt understands care advise and will follow up with [facility] or ED if unable to get appointment. Warm transfer to [facility] . Please do not respond to the triage nurse through this encounter. Any subsequent communication should be directly with the patient.

## 2020-09-28 NOTE — TELEPHONE ENCOUNTER
Knee swollen double size     Reason for Disposition   [1] Very swollen joint AND [2] no fever    Answer Assessment - Initial Assessment Questions  1. LOCATION: \"Where is the swelling located? \"  (e.g., left, right, both knees)      Pt states right knee knee and right wrist   2. SIZE and DESCRIPTION: \"What does the swelling look like? \"  (e.g., entire knee, localized)      Pt states  Double size of left side   3. ONSET: \"When did the swelling start? \" \"Does it come and go, or is it there all the time? \"      Pt states it started yesterday afternoon around 4 or 5  4. PAIN: \"Is there any pain? \" If so, ask: \"How bad is it? \" (Scale 1-10; or mild, moderate, severe)      Pt states it is painful when walking on it  5. SETTING: \"Has there been any recent work, exercise or other activity that involved that part of the body? \"       Pt denies injury  6. AGGRAVATING FACTORS: \"What makes the knee swelling worse? \" (e.g., walking, climbing stairs, running)      When walking on it  7. ASSOCIATED SYMPTOMS: \"Is there any pain or redness? \"      Pain but no redness  8. OTHER SYMPTOMS: \"Do you have any other symptoms? \" (e.g., chest pain, difficulty breathing, fever, calf pain)      Pt denies   9. PREGNANCY: \"Is there any chance you are pregnant? \" \"When was your last menstrual period? \"      NA    Protocols used: KNEE SWELLING-ADULT-

## 2021-01-01 DIAGNOSIS — I25.10 CORONARY ARTERY DISEASE INVOLVING NATIVE CORONARY ARTERY OF NATIVE HEART WITHOUT ANGINA PECTORIS: ICD-10-CM

## 2021-01-01 DIAGNOSIS — R73.9 HYPERGLYCEMIA: ICD-10-CM

## 2021-01-01 DIAGNOSIS — I10 ESSENTIAL HYPERTENSION: ICD-10-CM

## 2021-01-01 LAB
A/G RATIO: 2.2 (ref 1.1–2.2)
ALBUMIN SERPL-MCNC: 4.3 G/DL (ref 3.4–5)
ALP BLD-CCNC: 84 U/L (ref 40–129)
ALT SERPL-CCNC: 13 U/L (ref 10–40)
ANION GAP SERPL CALCULATED.3IONS-SCNC: 9 MMOL/L (ref 3–16)
AST SERPL-CCNC: 16 U/L (ref 15–37)
BASOPHILS ABSOLUTE: 0 K/UL (ref 0–0.2)
BASOPHILS RELATIVE PERCENT: 0.5 %
BILIRUB SERPL-MCNC: 0.3 MG/DL (ref 0–1)
BUN BLDV-MCNC: 16 MG/DL (ref 7–20)
CALCIUM SERPL-MCNC: 9.3 MG/DL (ref 8.3–10.6)
CHLORIDE BLD-SCNC: 108 MMOL/L (ref 99–110)
CHOLESTEROL, TOTAL: 148 MG/DL (ref 0–199)
CO2: 26 MMOL/L (ref 21–32)
CREAT SERPL-MCNC: 0.7 MG/DL (ref 0.8–1.3)
EOSINOPHILS ABSOLUTE: 0.4 K/UL (ref 0–0.6)
EOSINOPHILS RELATIVE PERCENT: 5.9 %
ESTIMATED AVERAGE GLUCOSE: 108.3 MG/DL
GFR AFRICAN AMERICAN: >60
GFR NON-AFRICAN AMERICAN: >60
GLOBULIN: 2 G/DL
GLUCOSE BLD-MCNC: 102 MG/DL (ref 70–99)
HBA1C MFR BLD: 5.4 %
HCT VFR BLD CALC: 41.9 % (ref 40.5–52.5)
HDLC SERPL-MCNC: 52 MG/DL (ref 40–60)
HEMOGLOBIN: 14 G/DL (ref 13.5–17.5)
LDL CHOLESTEROL CALCULATED: 75 MG/DL
LYMPHOCYTES ABSOLUTE: 1.7 K/UL (ref 1–5.1)
LYMPHOCYTES RELATIVE PERCENT: 25.9 %
MCH RBC QN AUTO: 30.7 PG (ref 26–34)
MCHC RBC AUTO-ENTMCNC: 33.4 G/DL (ref 31–36)
MCV RBC AUTO: 91.8 FL (ref 80–100)
MONOCYTES ABSOLUTE: 0.4 K/UL (ref 0–1.3)
MONOCYTES RELATIVE PERCENT: 6 %
NEUTROPHILS ABSOLUTE: 4 K/UL (ref 1.7–7.7)
NEUTROPHILS RELATIVE PERCENT: 61.7 %
PDW BLD-RTO: 14.2 % (ref 12.4–15.4)
PLATELET # BLD: 203 K/UL (ref 135–450)
PMV BLD AUTO: 9.2 FL (ref 5–10.5)
POTASSIUM SERPL-SCNC: 5 MMOL/L (ref 3.5–5.1)
RBC # BLD: 4.56 M/UL (ref 4.2–5.9)
SODIUM BLD-SCNC: 143 MMOL/L (ref 136–145)
TOTAL PROTEIN: 6.3 G/DL (ref 6.4–8.2)
TRIGL SERPL-MCNC: 103 MG/DL (ref 0–150)
VLDLC SERPL CALC-MCNC: 21 MG/DL
WBC # BLD: 6.5 K/UL (ref 4–11)

## 2021-07-28 PROBLEM — H92.09 OTALGIA: Status: ACTIVE | Noted: 2021-07-28

## 2021-07-28 PROBLEM — H61.20 CERUMEN IMPACTION: Status: ACTIVE | Noted: 2021-07-28

## 2021-11-16 ENCOUNTER — APPOINTMENT (OUTPATIENT)
Dept: MRI IMAGING | Age: 76
DRG: 167 | End: 2021-11-16
Payer: MEDICARE

## 2021-11-16 ENCOUNTER — APPOINTMENT (OUTPATIENT)
Dept: CT IMAGING | Age: 76
DRG: 167 | End: 2021-11-16
Payer: MEDICARE

## 2021-11-16 ENCOUNTER — DIRECT ADMIT ORDERS (OUTPATIENT)
Dept: INTERNAL MEDICINE CLINIC | Age: 76
End: 2021-11-16

## 2021-11-16 ENCOUNTER — APPOINTMENT (OUTPATIENT)
Dept: GENERAL RADIOLOGY | Age: 76
DRG: 167 | End: 2021-11-16
Payer: MEDICARE

## 2021-11-16 ENCOUNTER — HOSPITAL ENCOUNTER (INPATIENT)
Age: 76
LOS: 2 days | Discharge: HOME | DRG: 167 | End: 2021-11-18
Attending: EMERGENCY MEDICINE | Admitting: INTERNAL MEDICINE
Payer: MEDICARE

## 2021-11-16 DIAGNOSIS — R22.2 MASS IN CHEST: ICD-10-CM

## 2021-11-16 DIAGNOSIS — E83.52 HYPERCALCEMIA: Primary | ICD-10-CM

## 2021-11-16 DIAGNOSIS — C34.92 PRIMARY MALIGNANT NEOPLASM OF LEFT LUNG METASTATIC TO OTHER SITE (HCC): ICD-10-CM

## 2021-11-16 DIAGNOSIS — R91.8 LUNG MASS: ICD-10-CM

## 2021-11-16 DIAGNOSIS — R73.9 HYPERGLYCEMIA: ICD-10-CM

## 2021-11-16 LAB
A/G RATIO: 1.3 (ref 1.1–2.2)
ALBUMIN SERPL-MCNC: 3.9 G/DL (ref 3.4–5)
ALP BLD-CCNC: 118 U/L (ref 40–129)
ALT SERPL-CCNC: 21 U/L (ref 10–40)
ANION GAP SERPL CALCULATED.3IONS-SCNC: 10 MMOL/L (ref 3–16)
ANION GAP SERPL CALCULATED.3IONS-SCNC: 15 MMOL/L (ref 3–16)
AST SERPL-CCNC: 29 U/L (ref 15–37)
BASOPHILS ABSOLUTE: 0.1 K/UL (ref 0–0.2)
BASOPHILS RELATIVE PERCENT: 0.5 %
BILIRUB SERPL-MCNC: 0.3 MG/DL (ref 0–1)
BUN BLDV-MCNC: 34 MG/DL (ref 7–20)
BUN BLDV-MCNC: 35 MG/DL (ref 7–20)
CALCIUM SERPL-MCNC: 12.3 MG/DL (ref 8.3–10.6)
CALCIUM SERPL-MCNC: 14 MG/DL (ref 8.3–10.6)
CHLORIDE BLD-SCNC: 100 MMOL/L (ref 99–110)
CHLORIDE BLD-SCNC: 94 MMOL/L (ref 99–110)
CO2: 28 MMOL/L (ref 21–32)
CO2: 28 MMOL/L (ref 21–32)
CREAT SERPL-MCNC: 1.2 MG/DL (ref 0.8–1.3)
CREAT SERPL-MCNC: 1.3 MG/DL (ref 0.8–1.3)
EKG ATRIAL RATE: 89 BPM
EKG DIAGNOSIS: NORMAL
EKG P AXIS: 44 DEGREES
EKG P-R INTERVAL: 158 MS
EKG Q-T INTERVAL: 348 MS
EKG QRS DURATION: 84 MS
EKG QTC CALCULATION (BAZETT): 423 MS
EKG R AXIS: -29 DEGREES
EKG T AXIS: 42 DEGREES
EKG VENTRICULAR RATE: 89 BPM
EOSINOPHILS ABSOLUTE: 0 K/UL (ref 0–0.6)
EOSINOPHILS RELATIVE PERCENT: 0.1 %
GFR AFRICAN AMERICAN: >60
GFR AFRICAN AMERICAN: >60
GFR NON-AFRICAN AMERICAN: 54
GFR NON-AFRICAN AMERICAN: 59
GLUCOSE BLD-MCNC: 119 MG/DL (ref 70–99)
GLUCOSE BLD-MCNC: 120 MG/DL (ref 70–99)
GLUCOSE BLD-MCNC: 123 MG/DL (ref 70–99)
GLUCOSE BLD-MCNC: 144 MG/DL (ref 70–99)
GLUCOSE BLD-MCNC: 167 MG/DL (ref 70–99)
GLUCOSE BLD-MCNC: 169 MG/DL (ref 70–99)
GLUCOSE BLD-MCNC: 221 MG/DL (ref 70–99)
HCT VFR BLD CALC: 39.8 % (ref 40.5–52.5)
HEMOGLOBIN: 13.5 G/DL (ref 13.5–17.5)
LACTIC ACID, SEPSIS: 1.8 MMOL/L (ref 0.4–1.9)
LYMPHOCYTES ABSOLUTE: 0.9 K/UL (ref 1–5.1)
LYMPHOCYTES RELATIVE PERCENT: 8.8 %
MAGNESIUM: 2.4 MG/DL (ref 1.8–2.4)
MCH RBC QN AUTO: 29 PG (ref 26–34)
MCHC RBC AUTO-ENTMCNC: 33.8 G/DL (ref 31–36)
MCV RBC AUTO: 85.7 FL (ref 80–100)
MONOCYTES ABSOLUTE: 0.3 K/UL (ref 0–1.3)
MONOCYTES RELATIVE PERCENT: 2.5 %
NEUTROPHILS ABSOLUTE: 9.2 K/UL (ref 1.7–7.7)
NEUTROPHILS RELATIVE PERCENT: 88.1 %
PDW BLD-RTO: 13.4 % (ref 12.4–15.4)
PERFORMED ON: ABNORMAL
PLATELET # BLD: 171 K/UL (ref 135–450)
PMV BLD AUTO: 8.3 FL (ref 5–10.5)
POTASSIUM REFLEX MAGNESIUM: 4.4 MMOL/L (ref 3.5–5.1)
POTASSIUM SERPL-SCNC: 4.3 MMOL/L (ref 3.5–5.1)
RBC # BLD: 4.65 M/UL (ref 4.2–5.9)
SODIUM BLD-SCNC: 137 MMOL/L (ref 136–145)
SODIUM BLD-SCNC: 138 MMOL/L (ref 136–145)
T3 FREE: 1.8 PG/ML (ref 2.3–4.2)
TOTAL PROTEIN: 7 G/DL (ref 6.4–8.2)
TSH REFLEX: 1.19 UIU/ML (ref 0.27–4.2)
WBC # BLD: 10.4 K/UL (ref 4–11)

## 2021-11-16 PROCEDURE — 82542 COL CHROMOTOGRAPHY QUAL/QUAN: CPT

## 2021-11-16 PROCEDURE — 99223 1ST HOSP IP/OBS HIGH 75: CPT | Performed by: INTERNAL MEDICINE

## 2021-11-16 PROCEDURE — 6370000000 HC RX 637 (ALT 250 FOR IP): Performed by: INTERNAL MEDICINE

## 2021-11-16 PROCEDURE — 80053 COMPREHEN METABOLIC PANEL: CPT

## 2021-11-16 PROCEDURE — 6360000004 HC RX CONTRAST MEDICATION: Performed by: INTERNAL MEDICINE

## 2021-11-16 PROCEDURE — 99283 EMERGENCY DEPT VISIT LOW MDM: CPT

## 2021-11-16 PROCEDURE — 83605 ASSAY OF LACTIC ACID: CPT

## 2021-11-16 PROCEDURE — 93005 ELECTROCARDIOGRAM TRACING: CPT | Performed by: PHYSICIAN ASSISTANT

## 2021-11-16 PROCEDURE — 85025 COMPLETE CBC W/AUTO DIFF WBC: CPT

## 2021-11-16 PROCEDURE — 2580000003 HC RX 258: Performed by: INTERNAL MEDICINE

## 2021-11-16 PROCEDURE — 70553 MRI BRAIN STEM W/O & W/DYE: CPT

## 2021-11-16 PROCEDURE — 36415 COLL VENOUS BLD VENIPUNCTURE: CPT

## 2021-11-16 PROCEDURE — 6360000002 HC RX W HCPCS: Performed by: INTERNAL MEDICINE

## 2021-11-16 PROCEDURE — 83735 ASSAY OF MAGNESIUM: CPT

## 2021-11-16 PROCEDURE — 74176 CT ABD & PELVIS W/O CONTRAST: CPT

## 2021-11-16 PROCEDURE — 94640 AIRWAY INHALATION TREATMENT: CPT

## 2021-11-16 PROCEDURE — 87040 BLOOD CULTURE FOR BACTERIA: CPT

## 2021-11-16 PROCEDURE — 94761 N-INVAS EAR/PLS OXIMETRY MLT: CPT

## 2021-11-16 PROCEDURE — 84443 ASSAY THYROID STIM HORMONE: CPT

## 2021-11-16 PROCEDURE — 93010 ELECTROCARDIOGRAM REPORT: CPT | Performed by: INTERNAL MEDICINE

## 2021-11-16 PROCEDURE — 71045 X-RAY EXAM CHEST 1 VIEW: CPT

## 2021-11-16 PROCEDURE — 83970 ASSAY OF PARATHORMONE: CPT

## 2021-11-16 PROCEDURE — 71250 CT THORAX DX C-: CPT

## 2021-11-16 PROCEDURE — 2580000003 HC RX 258: Performed by: PHYSICIAN ASSISTANT

## 2021-11-16 PROCEDURE — 84481 FREE ASSAY (FT-3): CPT

## 2021-11-16 PROCEDURE — A9577 INJ MULTIHANCE: HCPCS | Performed by: INTERNAL MEDICINE

## 2021-11-16 PROCEDURE — 1200000000 HC SEMI PRIVATE

## 2021-11-16 RX ORDER — SODIUM CHLORIDE 9 MG/ML
25 INJECTION, SOLUTION INTRAVENOUS PRN
Status: CANCELLED | OUTPATIENT
Start: 2021-11-16

## 2021-11-16 RX ORDER — DEXAMETHASONE SODIUM PHOSPHATE 10 MG/ML
6 INJECTION INTRAMUSCULAR; INTRAVENOUS EVERY 6 HOURS
Status: DISCONTINUED | OUTPATIENT
Start: 2021-11-16 | End: 2021-11-17

## 2021-11-16 RX ORDER — ATORVASTATIN CALCIUM 40 MG/1
40 TABLET, FILM COATED ORAL DAILY
Status: DISCONTINUED | OUTPATIENT
Start: 2021-11-16 | End: 2021-11-18 | Stop reason: HOSPADM

## 2021-11-16 RX ORDER — DEXTROSE MONOHYDRATE 25 G/50ML
12.5 INJECTION, SOLUTION INTRAVENOUS PRN
Status: DISCONTINUED | OUTPATIENT
Start: 2021-11-16 | End: 2021-11-18 | Stop reason: HOSPADM

## 2021-11-16 RX ORDER — NICOTINE POLACRILEX 4 MG
15 LOZENGE BUCCAL PRN
Status: DISCONTINUED | OUTPATIENT
Start: 2021-11-16 | End: 2021-11-18 | Stop reason: HOSPADM

## 2021-11-16 RX ORDER — METOPROLOL SUCCINATE 50 MG/1
50 TABLET, EXTENDED RELEASE ORAL DAILY
Status: DISCONTINUED | OUTPATIENT
Start: 2021-11-16 | End: 2021-11-18 | Stop reason: HOSPADM

## 2021-11-16 RX ORDER — SODIUM CHLORIDE 0.9 % (FLUSH) 0.9 %
5-40 SYRINGE (ML) INJECTION PRN
Status: CANCELLED | OUTPATIENT
Start: 2021-11-16

## 2021-11-16 RX ORDER — BUDESONIDE AND FORMOTEROL FUMARATE DIHYDRATE 160; 4.5 UG/1; UG/1
1 AEROSOL RESPIRATORY (INHALATION) 2 TIMES DAILY
Status: DISCONTINUED | OUTPATIENT
Start: 2021-11-16 | End: 2021-11-18 | Stop reason: HOSPADM

## 2021-11-16 RX ORDER — ONDANSETRON 4 MG/1
4 TABLET, ORALLY DISINTEGRATING ORAL EVERY 8 HOURS PRN
Status: CANCELLED | OUTPATIENT
Start: 2021-11-16

## 2021-11-16 RX ORDER — SODIUM CHLORIDE 9 MG/ML
INJECTION, SOLUTION INTRAVENOUS CONTINUOUS
Status: DISCONTINUED | OUTPATIENT
Start: 2021-11-16 | End: 2021-11-17

## 2021-11-16 RX ORDER — POLYETHYLENE GLYCOL 3350 17 G/17G
17 POWDER, FOR SOLUTION ORAL DAILY PRN
Status: CANCELLED | OUTPATIENT
Start: 2021-11-16

## 2021-11-16 RX ORDER — SODIUM CHLORIDE 0.9 % (FLUSH) 0.9 %
5-40 SYRINGE (ML) INJECTION EVERY 12 HOURS SCHEDULED
Status: CANCELLED | OUTPATIENT
Start: 2021-11-16

## 2021-11-16 RX ORDER — 0.9 % SODIUM CHLORIDE 0.9 %
500 INTRAVENOUS SOLUTION INTRAVENOUS ONCE
Status: COMPLETED | OUTPATIENT
Start: 2021-11-16 | End: 2021-11-16

## 2021-11-16 RX ORDER — DEXTROSE MONOHYDRATE 50 MG/ML
100 INJECTION, SOLUTION INTRAVENOUS PRN
Status: DISCONTINUED | OUTPATIENT
Start: 2021-11-16 | End: 2021-11-18 | Stop reason: HOSPADM

## 2021-11-16 RX ORDER — ACETAMINOPHEN 500 MG
1000 TABLET ORAL 4 TIMES DAILY PRN
Status: CANCELLED | OUTPATIENT
Start: 2021-11-16

## 2021-11-16 RX ORDER — ONDANSETRON 2 MG/ML
4 INJECTION INTRAMUSCULAR; INTRAVENOUS EVERY 6 HOURS PRN
Status: CANCELLED | OUTPATIENT
Start: 2021-11-16

## 2021-11-16 RX ORDER — ACETAMINOPHEN 500 MG
1000 TABLET ORAL EVERY 6 HOURS
Status: DISCONTINUED | OUTPATIENT
Start: 2021-11-16 | End: 2021-11-17

## 2021-11-16 RX ORDER — ACETAMINOPHEN 325 MG/1
650 TABLET ORAL EVERY 6 HOURS PRN
Status: CANCELLED | OUTPATIENT
Start: 2021-11-16

## 2021-11-16 RX ADMIN — BUDESONIDE AND FORMOTEROL FUMARATE DIHYDRATE 1 PUFF: 160; 4.5 AEROSOL RESPIRATORY (INHALATION) at 21:26

## 2021-11-16 RX ADMIN — ZOLEDRONIC ACID 4 MG: 4 INJECTION, SOLUTION, CONCENTRATE INTRAVENOUS at 09:34

## 2021-11-16 RX ADMIN — TIOTROPIUM BROMIDE INHALATION SPRAY 2 PUFF: 3.12 SPRAY, METERED RESPIRATORY (INHALATION) at 12:07

## 2021-11-16 RX ADMIN — BUDESONIDE AND FORMOTEROL FUMARATE DIHYDRATE 1 PUFF: 160; 4.5 AEROSOL RESPIRATORY (INHALATION) at 12:08

## 2021-11-16 RX ADMIN — DEXAMETHASONE SODIUM PHOSPHATE 6 MG: 10 INJECTION INTRAMUSCULAR; INTRAVENOUS at 23:22

## 2021-11-16 RX ADMIN — ATORVASTATIN CALCIUM 40 MG: 40 TABLET, FILM COATED ORAL at 09:29

## 2021-11-16 RX ADMIN — SODIUM CHLORIDE 500 ML: 9 INJECTION, SOLUTION INTRAVENOUS at 02:30

## 2021-11-16 RX ADMIN — MAGNESIUM HYDROXIDE 30 ML: 400 SUSPENSION ORAL at 21:44

## 2021-11-16 RX ADMIN — ACETAMINOPHEN 1000 MG: 500 TABLET ORAL at 21:44

## 2021-11-16 RX ADMIN — SODIUM CHLORIDE: 9 INJECTION, SOLUTION INTRAVENOUS at 16:15

## 2021-11-16 RX ADMIN — METOPROLOL SUCCINATE 50 MG: 50 TABLET, EXTENDED RELEASE ORAL at 09:29

## 2021-11-16 RX ADMIN — GADOBENATE DIMEGLUMINE 15 ML: 529 INJECTION, SOLUTION INTRAVENOUS at 17:51

## 2021-11-16 RX ADMIN — SODIUM CHLORIDE: 9 INJECTION, SOLUTION INTRAVENOUS at 11:16

## 2021-11-16 RX ADMIN — SODIUM CHLORIDE: 9 INJECTION, SOLUTION INTRAVENOUS at 04:27

## 2021-11-16 ASSESSMENT — PAIN DESCRIPTION - DESCRIPTORS: DESCRIPTORS: ACHING

## 2021-11-16 ASSESSMENT — PAIN SCALES - GENERAL
PAINLEVEL_OUTOF10: 6
PAINLEVEL_OUTOF10: 0
PAINLEVEL_OUTOF10: 1

## 2021-11-16 ASSESSMENT — PAIN DESCRIPTION - LOCATION: LOCATION: GENERALIZED

## 2021-11-16 ASSESSMENT — PAIN DESCRIPTION - FREQUENCY: FREQUENCY: CONTINUOUS

## 2021-11-16 ASSESSMENT — PAIN DESCRIPTION - PROGRESSION: CLINICAL_PROGRESSION: NOT CHANGED

## 2021-11-16 ASSESSMENT — PAIN DESCRIPTION - ONSET: ONSET: ON-GOING

## 2021-11-16 ASSESSMENT — PAIN - FUNCTIONAL ASSESSMENT: PAIN_FUNCTIONAL_ASSESSMENT: ACTIVITIES ARE NOT PREVENTED

## 2021-11-16 ASSESSMENT — PAIN DESCRIPTION - PAIN TYPE: TYPE: ACUTE PAIN

## 2021-11-16 NOTE — ED PROVIDER NOTES
[address] [facility]        Pt Name: Collette Whatley  MRN: [id number]  [place] 1945  Date of evaluation: 11/16/2021  Provider: Keshav Aly PA-C  PCP: Myranda Woodall MD  Note Started: 12:33 AM EST       I have seen and evaluated this patient with my supervising physician Dr. Soriano Plan       Chief Complaint   Patient presents with    Fatigue    Generalized Body Aches     x 2 weeks/ negative covid on sat    Other     sent by MD for abnormal lab results         HISTORY OF PRESENT ILLNESS   (Location/Symptom, Timing/Onset, Context/Setting, Quality, Duration, Modifying Factors, Severity)  Note limiting factors. Chief Complaint:Referred by his family doctor    Collette Whatley is a 78 y.o. male who presents who indicates that he has noticed a general fatigue over the last couple of weeks with some slow bowel movements compared to usual and somewhat achy but nonspecific. Patient states that he had a rapid Covid test on Saturday which was negative. Otherwise he states that he really has not had much cough or congestion. No sore throat or difficulty swallowing. No abdominal pain or difficulty eating or drinking. No acute urine changes other than it seems may be a little diluted to the patient. No extremity acute weakness or loss of range of motion or strength. He states that he did follow with his family doctor today who ordered a number of blood tests. Reportedly he was then called this evening by the family doctor and recommended to come to the ER because of elevated calcium noted and some increased BUN and an inflammatory marker. Therefore patient came in to be seen. No chest pain or shortness of breath, no abdominal pain or distention. No mental status changes.   Of note, patient states that about a week ago or so he stopped using his beta-blocker antihypertensive medication because he thought it might be adding to his feeling of fatigue. Nursing Notes were all reviewed and agreed with or any disagreements were addressed in the HPI. REVIEW OF SYSTEMS    (2-9 systems for level 4, 10 or more for level 5)     Review of Systems   Positive history as above with no fevers or chills cough or congestion, no headache vision change neck pain or stiffness. No shortness of breath or chest pain or palpitations. No syncope or near syncope. No abdominal pain or difficulty eating or drinking. No extremity acute one-sided weakness or loss of sensation or movement. No rash.     PAST MEDICAL HISTORY     Past Medical History:   Diagnosis Date    C. difficile colitis 02/2017    cefuroxime    CAD (coronary artery disease) 2002    Stent to LAD Jovita Colmenares)    [facility]ch[facility]) [facility])     GERD (gastroesophageal reflux disease)     Hyperlipidemia     Hypertension     Venous thrombosis of leg 03/2017    Occurred after a plane flight to [place]- Left saphenous vein extending to the saphenofemoral junction       SURGICAL HISTORY     Past Surgical History:   Procedure Laterality Date    CARDIAC CATHETERIZATION      NL 2/2007    CATARACT REMOVAL Left 2014    CATARACT REMOVAL Right 05/2017    COLONOSCOPY  08/2005    NL (Dr. Lou Theodore)    COLONOSCOPY  07/08/2016    Stotz- polyps, repeat 3 years    COLONOSCOPY  01/29/2020    Stotz-normal colon, repeat in 5 years    COLONOSCOPY  01/29/2020    Stotz-normal, repeat in 5 years    CORONARY ANGIOPLASTY WITH STENT PLACEMENT      LAD-12/2002-Dr. Wilcox Pain HAND SURGERY  1970's    SHOULDER SURGERY      RT Shoulder 1998, 1999    TOTAL HIP ARTHROPLASTY  RT-1997       CURRENTMEDICATIONS       Previous Medications    ASPIRIN 81 MG TABLET    Take 81 mg by mouth daily    ATORVASTATIN (LIPITOR) 40 MG TABLET    TAKE 1 TABLET EVERY EVENING    CELECOXIB (CELEBREX) 100 MG CAPSULE    Take 1 capsule by mouth 2 times daily Friends and Family: Not on file    Attends Restorationist Services: Not on file    Active Member of Clubs or Organizations: Not on file    Attends Club or Organization Meetings: Not on file    Marital Status: Not on file   Intimate Partner Violence:     Fear of Current or Ex-Partner: Not on file    Emotionally Abused: Not on file    Physically Abused: Not on file    Sexually Abused: Not on file   Housing Stability:     Unable to Pay for Housing in the Last Year: Not on file    Number of [place] in the Last Year: Not on file    Unstable Housing in the Last Year: Not on file       SCREENINGS             PHYSICAL EXAM    (up to 7 for level 4, 8 or more for level 5)     ED Triage Vitals [11/16/21 0022]   BP Temp Temp Source Pulse Resp SpO2 Height Weight   (!) 186/108 97.8 °F (36.6 °C) Oral 97 24 92 % -- 172 lb 13.5 oz (78.4 kg)       Physical Exam  Vitals and nursing note reviewed. Constitutional:       Appearance: Normal appearance. He is not diaphoretic. Comments: Pleasant, cooperative   HENT:      Head: Normocephalic and atraumatic. Right Ear: External ear normal.      Left Ear: External ear normal.      Nose: Nose normal.      Mouth/Throat:      Mouth: Mucous membranes are moist.      Comments: Gag reflex intact  Eyes:      General:         Right eye: No discharge. Left eye: No discharge. Conjunctiva/sclera: Conjunctivae normal.   Cardiovascular:      Rate and Rhythm: Normal rate and regular rhythm. Pulses: Normal pulses. Heart sounds: Normal heart sounds. No murmur heard. No gallop. Pulmonary:      Effort: Pulmonary effort is normal. No respiratory distress. Breath sounds: Normal breath sounds. No wheezing, rhonchi or rales. Abdominal:      General: Bowel sounds are normal. There is no distension. Palpations: Abdomen is soft. There is no mass. Tenderness: There is no abdominal tenderness. There is no right CVA tenderness or left CVA tenderness. Musculoskeletal:         General: No swelling, tenderness, deformity or signs of injury. Normal range of motion. Cervical back: Normal range of motion and neck supple. No rigidity or tenderness. Right lower leg: No edema. Left lower leg: No edema. Lymphadenopathy:      Cervical: No cervical adenopathy. Skin:     General: Skin is warm and dry. Capillary Refill: Capillary refill takes less than 2 seconds. Findings: No rash. Neurological:      Mental Status: He is alert and oriented to person, place, and time. Mental status is at baseline. GCS: GCS eye subscore is 4. GCS verbal subscore is 5. GCS motor subscore is 6. Cranial Nerves: Cranial nerves are intact. Sensory: Sensation is intact. Motor: Motor function is intact. Coordination: Coordination is intact. Deep Tendon Reflexes: Reflexes are normal and symmetric. Reflex Scores:       Patellar reflexes are 2+ on the right side and 2+ on the left side.   Psychiatric:         Mood and Affect: Mood normal.         Behavior: Behavior normal.         DIAGNOSTIC RESULTS   LABS:    Labs Reviewed   COMPREHENSIVE METABOLIC PANEL - Abnormal; Notable for the following components:       Result Value    Chloride 94 (*)     Glucose 221 (*)     BUN 34 (*)     GFR Non-African American 54 (*)     Calcium 14.0 (*)     All other components within normal limits    Narrative:     Bruno Delgado tel. [phone],  Chemistry results called to and read back by Patricia Robb rn, 11/16/2021  01:58, by [facility]  Performed at:  [place] [address]   Phone [phone]   CBC WITH AUTO DIFFERENTIAL - Abnormal; Notable for the following components:    Hematocrit 39.8 (*)     Neutrophils Absolute 9.2 (*)     Lymphocytes Absolute 0.9 (*)     All other components within normal limits    Narrative:     Performed at:  [facility]  [facility] [address] [facility] [address]   Phone [phone]   T3, FREE - Abnormal; Notable for the following components:    T3, Free 1.8 (*)     All other components within normal limits    Narrative:     Performed at:  [facility]  [address] [place] [facility] [address]   Phone [phone]   CULTURE, BLOOD 2   CULTURE, BLOOD 1   MAGNESIUM    Narrative:     Performed at:  [facility]  [address] [place] [facility] [address]   Phone [phone]   TSH WITH REFLEX    Narrative:     Performed at:  [address] [facility]  [address] [place] [facility] [address]   Phone [phone]   URINE RT REFLEX TO CULTURE   LACTATE, SEPSIS       When ordered, only abnormal lab results are displayed. All other labs were within normal range or not returned as of this dictation. EKG: When ordered, EKG's are interpreted by the Emergency Department Physician in the absence of a cardiologist.  Please see their note for interpretation of EKG. RADIOLOGY:   Non-plain film images such as CT, Ultrasound and MRI are read by the radiologist. [facility] radiographic images are visualized andpreliminarily interpreted by the  ED Provider with the below findings:        Interpretation [facility] Radiologist below, if available at the time of this note:    CT CHEST WO CONTRAST   Preliminary Result   1. 8.7 cm mass centered within the left lower lobe with extension across the   major fissure into the posterior left upper lobe. Findings are compatible   with neoplasm until proven otherwise. 2. Mediastinal and left hilar adenopathy likely metastatic in etiology. 3. Multifocal lytic metastases are noted in the visualize skeleton. There   are probable pathologic fractures involving the T9 vertebral body and right   scapular body. XR CHEST PORTABLE   Final Result   Questionable left hilar mass or adenopathy which is more apparent.   Recommend CT correlation. Mild discoid atelectasis or scarring along the lung bases. No results found. PROCEDURES   Unless otherwise noted below, none     Procedures    CRITICAL CARE TIME   N/A    CONSULTS:  IP CONSULT TO PRIMARY CARE PROVIDER      EMERGENCY DEPARTMENT COURSE and DIFFERENTIAL DIAGNOSIS/MDM:   Vitals:    Vitals:    11/16/21 0022 11/16/21 0033   BP: (!) 186/108 (!) 166/91   Pulse: 97    Resp: 24    Temp: 97.8 °F (36.6 °C)    TempSrc: Oral    SpO2: 92%    Weight: 172 lb 13.5 oz (78.4 kg)        Patient was given thefollowing medications:  Medications   0.9 % sodium chloride bolus (500 mLs IntraVENous New Bag 11/16/21 0230)         This patient presents as above and evaluation and treatment is begun. Electronic medical record reviewed which is helpful in the patient's care including the note from the family doctor visit from yesterday and the lab work obtained at that time. Additional work-up begun at this time. Labs come back showing magnesium not elevated. CMP is significant for BUN 34, glucose 221 and chloride 94 with potassium elevated at 14. Recheck of patient's record shows no previous history of diabetes. Also it is noted patient's free T3 is 1.8 while TSH is 1.19. EKG obtained and interpreted by ED physician. Please see that note for details. Chest x-ray shows the mass versus hilar adenopathy as noted above. CT chest plain now ordered. This returns as above showing the mass in the left side of the chest.  Patient does need to be admitted for further care and treatment, specifically that mass present as well as hyper calciumia and hyperglycemia. Dr. Valerie Stanton consultation placed at this time. He agrees to this admission. Patient is in fair condition     FINAL IMPRESSION      1. Hypercalcemia    2. Hyperglycemia    3. Mass in chest          DISPOSITION/PLAN   DISPOSITION        PATIENT REFERREDTO:  No follow-up provider specified.     DISCHARGE MEDICATIONS:  New Prescriptions    No medications on file       DISCONTINUED MEDICATIONS:  Discontinued Medications    No medications on file              (Please note that portions ofthis note were completed with a voice recognition program.  Efforts were made to edit the dictations but occasionally words are mis-transcribed.)    Timoteo Harrell PA-C (electronically signed)             Timoteo Harrell PA-C  11/16/21 1620

## 2021-11-16 NOTE — PROGRESS NOTES
4 Eyes Skin Assessment     NAME:  Collette Whatley  YOB: 1945  MEDICAL RECORD NUMBER:  [id number]    The patient is being assess for  Admission    I agree that 2 RN's have performed a thorough Head to Toe Skin Assessment on the patient. ALL assessment sites listed below have been assessed. Areas assessed by both nurses:    Head, Face, Ears, Shoulders, Back, Chest, Arms, Elbows, Hands, Sacrum. Buttock, Coccyx, Ischium and Legs. Feet and Heels        Does the Patient have a Wound?  No noted wound(s)       Tom Prevention initiated:  No   Wound Care Orders initiated:  No    Pressure Injury (Stage 3,4, Unstageable, DTI, NWPT, and Complex wounds) if present place consult order under [de-identified] No    New and Established Ostomies if present place consult order under : No      Nurse 1 eSignature: Electronically signed by Alma Tate RN on 11/16/21 at 5:31 AM EST    **SHARE this note so that the co-signing nurse is able to place an eSignature**    Nurse 2 eSignature: Electronically signed by Radha Carrington RN on 11/16/21 at 7:05 AM EST

## 2021-11-16 NOTE — PROGRESS NOTES
Comprehensive Nutrition Assessment    Type and Reason for Visit:  Initial, Positive Nutrition Screen    Nutrition Recommendations/Plan:   - Resume regular diet after procedure  - Monitor PO intakes for possible need of ONS    Nutrition Assessment:  Positive nutrition screen. Hx COPD. Pt recently diagnosed with left lower lung lobe mass with extension across the major fissure into the upper lobe as well as hilar adenopathy and lytic bone lesions. NPO today for possible bronch. Previously on regular diet. No significant wt loss per EMR. Will monitor intakes once diet resumes for possible ONS. Malnutrition Assessment:  Malnutrition Status: At risk for malnutrition (Comment)    Context:  Chronic Illness       Estimated Daily Nutrient Needs:  Energy (kcal):  2030-5743 (20-25kcal/76kg); Weight Used for Energy Requirements:  Current     Protein (g):  84-98 (1.2-1.4g/70kg); Weight Used for Protein Requirements:  Ideal        Fluid (ml/day): 1 ml/kcal      Nutrition Related Findings:  Calcium 12.3. Glucose 169. No edema. Wounds:  None       Current Nutrition Therapies:    Diet NPO    Anthropometric Measures:  · Height: 5' 8\" (172.7 cm)  · Current Body Weight: 166 lb (75.3 kg)   · Admission Body Weight: 172 lb (78 kg)    · Usual Body Weight: 176 lb (79.8 kg) (per EMR)     · Ideal Body Weight: 154 lbs; % Ideal Body Weight 107.8 %   · BMI: 25.2  · BMI Categories: Overweight (BMI 25.0-29. 9)       Nutrition Diagnosis:   · Inadequate oral intake related to catabolic illness as evidenced by NPO or clear liquid status due to medical condition    Nutrition Interventions:   Food and/or Nutrient Delivery:   (Resume diet after procedure)  Nutrition Education/Counseling:  Education not indicated   Coordination of Nutrition Care:  Continue to monitor while inpatient    Goals:  PO intake greater than 50%       Nutrition Monitoring and Evaluation:   Behavioral-Environmental Outcomes:  None Identified   Food/Nutrient Intake Outcomes:  Food and Nutrient Intake  Physical Signs/Symptoms Outcomes:  Biochemical Data, GI Status, Fluid Status or Edema, Meal Time Behavior, Nutrition Focused Physical Findings, Weight     Discharge Planning:     Too soon to determine     Electronically signed by Melvin Wood, CHYNA, LD on 11/16/21 at 8:52 AM EST    Contact: [phone]

## 2021-11-16 NOTE — ED NOTES
Bed: B-07  Expected date:   Expected time:   Means of arrival:   Comments:  #1     Meliton Sepulveda RN  11/16/21 6875

## 2021-11-16 NOTE — CARE COORDINATION
INITIAL CASE MANAGEMENT ASSESSMENT    Reviewed chart, met with patient to assess possible discharge needs. Explained Case Management role/services. Living Situation: verified address, lives with wife in a 2 story house with 1 GINO    ADLs: independent     Transportation: active , wife can take him home [facility]     Medications: no barriers, uses [facility] in [place]    PCP: Siobhan Arambula MD    PLAN/COMMENTS: still need testing done denies any needs, [place]    CM provided contact information for patient or family to call with any questions. CM will follow and assist as needed.     Makenzie Stock RN, BSN,   [phone]    Electronically signed by Makenzie Stock RN on 11/16/2021 at 11:57 AM

## 2021-11-16 NOTE — PLAN OF CARE
Nutrition Problem #1: Inadequate oral intake  Intervention: Food and/or Nutrient Delivery:  (Resume diet after procedure)  Nutritional Goals: PO intake greater than 50%

## 2021-11-16 NOTE — CONSULTS
REASON FOR CONSULTATION/CC: Lung mass      Consult at request of Kaylyn Damico, *     PCP: Siobhan Arambula MD  Established Pulmonologist:  None    Chief Complaint   Patient presents with    Fatigue    Generalized Body Aches     x 2 weeks/ negative covid on sat    Other     sent by MD for abnormal lab results       HISTORY OF PRESENT ILLNESS: Margaret Pickering is a 78y.o. year old male with a history of COPD, smoker who presents with fatigue generalized malaise. Found to be hypercalcemic and CT scan showed large left-sided lung mass concerning for primary lung cancer. During my visit patient states he is asymptomatic from a pulmonary standpoint. Has not had much in the way of cough or shortness of breath. Past Medical History:   Diagnosis Date    C. difficile colitis 02/2017    cefuroxime    CAD (coronary artery disease) 2002    Stent to LAD [address]ett [address])    COPD (chronic obstructive pulmonary disease) (HCC)     GERD (gastroesophageal reflux disease)     Hyperlipidemia     Hypertension     Venous thrombosis of leg 03/2017    Occurred after a plane flight to [place]- Left saphenous vein extending to the saphenofemoral junction         Past Surgical History:   Procedure Laterality Date    CARDIAC CATHETERIZATION      NL 2/2007    CATARACT REMOVAL Left 2014    CATARACT REMOVAL Right 05/2017    COLONOSCOPY  08/2005    NL (Dr. Briana Victoria)    COLONOSCOPY  07/08/2016    Stotz- polyps, repeat 3 years    COLONOSCOPY  01/29/2020    Stotz-normal colon, repeat in 5 years    COLONOSCOPY  01/29/2020    Stotz-normal, repeat in 5 years    CORONARY ANGIOPLASTY WITH STENT PLACEMENT      LAD-12/2002-Dr. Franco Burrows HAND SURGERY  1970's   [place]      RT Shoulder 1998, [facility]       Family Hx  family history includes Heart Disease in his father; Lupus in his daughter; Other in his child; Parkinsonism in his mother.     Social Hx   reports that he quit smoking about 30 years ago. He started smoking about 42 years ago. He has a 2.50 pack-year smoking history. He has never used smokeless tobacco.    Scheduled Meds:   atorvastatin  40 mg Oral Daily    fluticasone-umeclidin-vilant  1 puff Inhalation Daily    metoprolol succinate  50 mg Oral Daily    insulin lispro  0-6 Units SubCUTAneous TID WC    insulin lispro  0-3 Units SubCUTAneous Nightly    zoledronic acid (ZOMETA) IVPB  4 mg IntraVENous Once       Continuous Infusions:   sodium chloride 150 mL/hr at 11/16/21 0427    dextrose         PRN Meds:  glucose, dextrose, glucagon (rDNA), dextrose    ALLERGIES:  Patient has No Known Allergies. REVIEW OF SYSTEMS:  Constitutional: Negative for fever  HENT: Negative for sore throat  Eyes: Negative for redness   Respiratory: Negative for dyspnea, cough  Cardiovascular: Negative for chest pain  Gastrointestinal: Negative for vomiting, diarrhea   Genitourinary: Negative for hematuria   Musculoskeletal: Negative for arthralgias   Skin: Negative for rash  Neurological: Negative for syncope  Hematological: Negative for adenopathy  Psychiatric/Behavorial: Negative for anxiety    Objective:   PHYSICAL EXAM:  Blood pressure (!) 160/83, pulse 81, temperature 98.2 °F (36.8 °C), temperature source Oral, resp. rate 18, height 5' 8\" (1.727 m), weight 166 lb 7.2 oz (75.5 kg), SpO2 94 %.'  Gen:  No acute distress. Eyes: PERRL. Anicteric sclera. No conjunctival injection. ENT: No discharge. Posterior oropharynx clear. External appearance of ears and nose normal.  Neck: Trachea midline. No mass   Resp:  No crackles. No wheezes. No rhonchi. No dullness on percussion. CV: Regular rate. Regular rhythm. No murmur or rub. No edema. GI: Soft, Non-tender. Non-distended. +BS  Skin: Warm, dry, w/o erythema. Lymph: No cervical or supraclavicular LAD. M/S: No cyanosis. No clubbing. Neuro:  no focal neurologic deficit.   Moves all extremities  Psych: Awake and alert, Oriented x 3.  Judgement and insight appropriate. Mood stable. Data Reviewed:   LABS:  CBC:   Recent Labs     11/15/21  1441 11/16/21  0058   WBC 9.2 10.4   HGB 14.4 13.5   HCT 43.5 39.8*   MCV 86.4 85.7    171     BMP:   Recent Labs     11/15/21  1441 11/16/21  0058 11/16/21  0724    137 138   K 4.4 4.3 4.4   CL 98* 94* 100   CO2 27 28 28   BUN 26* 34* 35*   CREATININE 1.3 1.3 1.2     LIVER PROFILE:   Recent Labs     11/15/21  1441 11/16/21  0058   AST 39* 29   ALT 22 21   BILITOT 0.3 0.3   ALKPHOS 129 118     PT/INR: No results for input(s): PROTIME, INR in the last 72 hours. APTT: No results for input(s): APTT in the last 72 hours. UA:  Recent Labs     11/15/21  1441   COLORU YELLOW   PHUR 6.5   WBCUA 5   RBCUA 3   MUCUS Rare*   BACTERIA 2+*   CLARITYU CLOUDY*   SPECGRAV 1.013   LEUKOCYTESUR Negative   UROBILINOGEN 0.2   BILIRUBINUR Negative   BLOODU Negative   GLUCOSEU Negative     No results for input(s): PHART, SPW7XKQ, PO2ART in the last 72 hours. Vent Information  SpO2: 94 %    Radiology Review:  Pertinent images / reports were reviewed as a part of this visit. Chest CT images reviewed personally by me, interpretation as follows:  -Large left-sided lower lobe mass. Large lymph node in the left mediastinum      Pulmonary function testing  Moderate airflow obstruction with a bronchodilator response.   Reduction in DLCO      Assessment/Plan:       Lung mass  -In setting of COPD, smoking and hypercalcemia highly suspicious for primary lung cancer  -We will get CT abdomen pelvis today to evaluate for possible distant mets if not identified we will pursue bronchoscopy with EBUS sampling of mediastinal adenopathy  -No openings on bronc scheduled for tomorrow scheduled for first thing Thursday morning will need to be n.p.o. tomorrow after midnight    COPD  -On Symbicort and Spiriva    Hypercalcemia  -Concern for hypercalcemia of malignancy  -Receive Zometa, on IV fluids  -Check PTH RP      This note was transcribed using [employer]. Please disregard any translational errors.     Thank you for the consult    [address]  Pulmonary, Sleep and Critical Care Medicine

## 2021-11-16 NOTE — ACP (ADVANCE CARE PLANNING)
Advance Care Planning     Advance Care Planning Activator (Inpatient)  Conversation Note      Date of ACP Conversation: 11/16/2021     Conversation Conducted with: Patient with Decision Making Capacity    ACP Activator: Amaris Couch RN    Health Care Decision Maker:     Current Designated Health Care Decision Maker:     Primary Decision Maker: León Farrell - Spouse - [phone]    Today we documented Decision Maker(s) consistent with Legal Next of Kin hierarchy. Care Preferences    Ventilation: \"If you were in your present state of health and suddenly became very ill and were unable to breathe on your own, what would your preference be about the use of a ventilator (breathing machine) if it were available to you? \"      Would the patient desire the use of ventilator (breathing machine)?: unsure    \"If your health worsens and it becomes clear that your chance of recovery is unlikely, what would your preference be about the use of a ventilator (breathing machine) if it were available to you? \"     Would the patient desire the use of ventilator (breathing machine)?: unsure      Resuscitation  \"CPR works best to restart the heart when there is a sudden event, like a heart attack, in someone who is otherwise healthy. Unfortunately, CPR does not typically restart the heart for people who have serious health conditions or who are very sick. \"    \"In the event your heart stopped as a result of an underlying serious health condition, would you want attempts to be made to restart your heart (answer \"yes\" for attempt to resuscitate) or would you prefer a natural death (answer \"no\" for do not attempt to resuscitate)? \" unsure       [] Yes   [] No   Educated Patient / Bob Ivey regarding differences between Advance Directives and portable DNR orders.     Length of ACP Conversation in minutes:  4 min  Conversation Outcomes:  [x] ACP discussion completed  [] Existing advance directive reviewed with patient; no changes to patient's previously recorded wishes  [] New Advance Directive completed  [] Portable Do Not Rescitate prepared for Provider review and signature  [] POLST/POST/MOLST/MOST prepared for Provider review and signature      Follow-up plan:    [] Schedule follow-up conversation to continue planning  [] Referred individual to Provider for additional questions/concerns   [] Advised patient/agent/surrogate to review completed ACP document and update if needed with changes in condition, patient preferences or care setting    [x] This note routed to one or more involved healthcare providers        Patient states he has a living will and not sure what it says.     Martha Rojas RN, BSN,   [phone]  Electronically signed by Martha Rojas RN on 11/16/2021 at 12:02 PM

## 2021-11-16 NOTE — ED PROVIDER NOTES
EMERGENCY DEPARTMENT ENCOUNTER      Pt Name: Susie Graham  MRN: [id number]  [place] 1945  Date of evaluation: 11/16/2021  Provider: Jr Melendez MD    [address] [place] [address]       Chief Complaint   Patient presents with    Fatigue    Generalized Body Aches     x 2 weeks/ negative covid on sat    Other     sent by MD for abnormal lab results       [facility]    Susie Graham is a 78 y.o. male who presents to the emergency department with fatigue, abnormal labs. Patient presents with feeling weak and generalized fatigue for the last few weeks. His doctor called him and told him his calcium was extremely elevated and need to come to the emergency room. No other associated symptoms. Nursing Notes were reviewed. Including nursing noted for FM, Surgical History, Past Medical History, Social History, vitals, and allergies; agree with all. REVIEW OF SYSTEMS       Review of Systems    Except as noted above the remainder of the review of systems was reviewed and negative.      PAST MEDICAL HISTORY     Past Medical History:   Diagnosis Date    C. difficile colitis 02/2017    cefuroxime    CAD (coronary artery disease) 2002    Stent to LAD Kera Mala)    COPD [facility]ch[facility] pulmonary disease) ([facility] [address]     GERD (gastroesophageal reflux disease)     Hyperlipidemia     Hypertension     Venous thrombosis of leg 03/2017    Occurred after a plane flight to [place]- Left saphenous vein extending to the saphenofemoral junction       SURGICAL HISTORY       Past Surgical History:   Procedure Laterality Date    CARDIAC CATHETERIZATION      NL 2/2007    CATARACT REMOVAL Left 2014    CATARACT REMOVAL Right 05/2017    COLONOSCOPY  08/2005    NL (Dr. Varsha Quintanilla)    COLONOSCOPY  07/08/2016    Stotz- polyps, repeat 3 years    COLONOSCOPY  01/29/2020    Stotz-normal colon, repeat in 5 years    COLONOSCOPY  01/29/2020    Stotz-normal, repeat in 5 years   [facility] PLACEMENT      LAD-2002-Dr. Catheryn Barthel HAND SURGERY  1970's    SHOULDER SURGERY      RT Shoulder , [facility] ARTHROPLASTY  RT-       CURRENT MEDICATIONS       Current Discharge Medication List      CONTINUE these medications which have NOT CHANGED    Details   celecoxib (CELEBREX) 100 MG capsule Take 1 capsule by mouth 2 times daily  Qty: 30 capsule, Refills: 0      atorvastatin (LIPITOR) 40 MG tablet TAKE 1 TABLET EVERY EVENING  Qty: 90 tablet, Refills: 3      metoprolol succinate (TOPROL XL) 50 MG extended release tablet TAKE 1 TABLET EVERY DAY  Qty: 90 tablet, Refills: 3      aspirin 81 MG tablet Take 81 mg by mouth daily      tiZANidine (ZANAFLEX) 2 MG tablet Take 1 tablet by mouth 3 times daily as needed (back pain)  Qty: 30 tablet, Refills: 1      fluticasone-umeclidin-vilant (TRELEGY ELLIPTA) 100-62.5-25 MCG/INH AEPB Inhale 1 puff into the lungs daily  Qty: 2 each, Refills: 0             ALLERGIES     Patient has no known allergies.     FAMILY HISTORY        Family History   Problem Relation Age of Onset    Heart Disease Father     Parkinsonism Mother     Other Child         Cardiomypathy vs Lye    Lupus Daughter        SOCIAL HISTORY       Social History     Socioeconomic History    Marital status:      Spouse name: Not on file    Number of children: 3    Years of education: Not on file    Highest education level: Not on file   Occupational History    Not on file   Tobacco Use    Smoking status: Former Smoker     Packs/day: 0.25     Years: 10.00     Pack years: 2.50     Start date: 7/10/1979     Quit date: 1991     Years since quittin.8    Smokeless tobacco: Never Used   Substance and Sexual Activity    Alcohol use: Yes     Comment: 1-2 beers daily     Drug use: No    Sexual activity: Not on file   Other Topics Concern    Not on file   Social History Narrative    Not on file     Social Determinants of Health     Financial Resource Strain: Unknown    Difficulty of Paying Living Expenses: Patient refused   Food Insecurity: No Food Insecurity    Worried About Running Out of Food in the Last Year: Never true    Ran Out of Food in the Last Year: Never true   Transportation Needs:     Lack of Transportation (Medical): Not on file    Lack of Transportation (Non-Medical): Not on file   Physical Activity:     Days of Exercise per Week: Not on file    Minutes of Exercise per Session: Not on file   Stress:     Feeling of Stress : Not on file   Social Connections:     Frequency of Communication with Friends and Family: Not on file    Frequency of Social Gatherings with Friends and Family: Not on file    Attends Congregation Services: Not on file    Active Member of [address] [place] [address] [employer] or Organizations: Not on file    Attends Club or Organization Meetings: Not on file    Marital Status: Not on file   Intimate Partner Violence:     Fear of Current or Ex-Partner: Not on file    Emotionally Abused: Not on file    Physically Abused: Not on file    Sexually Abused: Not on file   Housing Stability:     Unable to Pay for Housing in the Last Year: Not on file    Number of [place] in the Last Year: Not on file    Unstable Housing in the Last Year: Not on file       PHYSICAL EXAM       ED Triage Vitals   BP Temp Temp Source Pulse Resp SpO2 Height Weight   11/16/21 0022 11/16/21 0022 11/16/21 0022 11/16/21 0022 11/16/21 0022 11/16/21 0022 11/16/21 0448 11/16/21 0022   (!) 186/108 97.8 °F (36.6 °C) Oral 97 24 92 % 5' 8\" (1.727 m) 172 lb 13.5 oz (78.4 kg)       Physical Exam  Vitals and nursing note reviewed. Constitutional:       General: He is not in acute distress. Appearance: He is well-developed. He is not diaphoretic. HENT:      Head: Normocephalic and atraumatic. Eyes:      General:         Right eye: No discharge. Left eye: No discharge. Pupils: Pupils are equal, round, and reactive to light. Neck:      Thyroid: No thyromegaly.       Trachea: No tracheal deviation. Cardiovascular:      Rate and Rhythm: Normal rate and regular rhythm. Heart sounds: No murmur heard. Pulmonary:      Breath sounds: No wheezing or rales. Chest:      Chest wall: No tenderness. Abdominal:      General: There is no distension. Palpations: Abdomen is soft. There is no mass. Tenderness: There is no abdominal tenderness. There is no guarding or rebound. Musculoskeletal:         General: No tenderness or deformity. Normal range of motion. Cervical back: Normal range of motion. Skin:     General: Skin is warm. Coloration: Skin is not pale. Findings: No erythema or rash. Neurological:      Mental Status: He is alert. Cranial Nerves: No cranial nerve deficit. Motor: No abnormal muscle tone. Coordination: Coordination normal.         DIAGNOSTIC RESULTS     RADIOLOGY:   Non-plain film images such as CT, Ultrasoundand MRI are read by the radiologist. Plain radiographic images are visualized and preliminarily interpreted by the emergency physician with the below findings:    Impression   1. 8.7 cm mass centered within the left lower lobe with extension across the   major fissure into the posterior left upper lobe.  Findings are compatible   with neoplasm until proven otherwise. 2. Mediastinal and left hilar adenopathy likely metastatic in etiology. 3. Multifocal lytic metastases are noted in the visualize skeleton.  There   are probable pathologic fractures involving the T9 vertebral body and right   scapular body.      ED BEDSIDE ULTRASOUND:   Performed by ED Physician - none    LABS:  Labs Reviewed   COMPREHENSIVE METABOLIC PANEL - Abnormal; Notable for the following components:       Result Value    Chloride 94 (*)     Glucose 221 (*)     BUN 34 (*)     GFR Non-African American 54 (*)     Calcium 14.0 (*)     All other components within normal limits    Narrative:     Woody Marquette tel. [phone],  Chemistry results called to and read back by Sarabjit Delarosa rn, 11/16/2021  01:58, by [facility]  Performed at:  [place] [address] [address] [facility] / [facility] [employer] [address]   Phone [phone]   CBC WITH AUTO DIFFERENTIAL - Abnormal; Notable for the following components:    Hematocrit 39.8 (*)     Neutrophils Absolute 9.2 (*)     Lymphocytes Absolute 0.9 (*)     All other components within normal limits    Narrative:     Performed at:  [address] [employer] [address]   Phone [phone]   T3, FREE - Abnormal; Notable for the following components:    T3, Free 1.8 (*)     All other components within normal limits    Narrative:     Performed at:  [address] [employer] [address]   Phone [phone]   CULTURE, BLOOD 2   CULTURE, BLOOD 1   MAGNESIUM    Narrative:     Performed at:  [address] [employer] [address]   Phone [phone]   TSH WITH REFLEX    Narrative:     Performed at:  [address] [employer] [address]   Phone [phone]   URINE RT REFLEX TO CULTURE   LACTATE, SEPSIS   BASIC METABOLIC PANEL W/ REFLEX TO MG FOR LOW K       All other labs were withinnormal range or not returned as of this dictation. EMERGENCY DEPARTMENT COURSE and DIFFERENTIAL DIAGNOSIS/MDM:     PMH, Surgical Hx, FH, Social Hx reviewed by myself (ETOH usage, Tobacco usage, Drug usage reviewed by myself, no pertinent Hx)- No Pertinent Hx     Old records were reviewed by me    68-year-old male with malignant neoplasm. CT discussed with patient. He will be admitted for further inpatient evaluation. Will need to see hematology oncology and have his calcium lowered. Fluids initiated. CRITICAL CARE TIME   Total Critical Caretime was 0 minutes, excluding separately reportable procedures.   There was a high probability of clinically significant/life threatening deterioration in the patient's condition which required my urgent intervention. PROCEDURES:  Unlessotherwise noted below, none    FINAL IMPRESSION      1. Hypercalcemia    2. Hyperglycemia    3. Mass in chest          DISPOSITION/PLAN   DISPOSITION Admitted 11/16/2021 03:37:20 AM    PATIENT REFERRED TO:  No follow-up provider specified.     DISCHARGE MEDICATIONS:  Current Discharge Medication List             (Please note that portions ofthis note were completed with a voice recognition program.  Efforts were made to edit the dictations but occasionally words are mis-transcribed.)    Alban Espana MD(electronically signed)  Attending Emergency Physician            Alban Espana MD  11/16/21 8667

## 2021-11-16 NOTE — CONSULTS
[address]                                  CONSULTATION    PATIENT NAME: Tatianna Stewart                   :        1945  MED REC NO:   [id number]                          ROOM:       65  ACCOUNT NO:   [de-identified]                           ADMIT DATE: 2021  PROVIDER:     Dian Vogel MD    ONCOLOGY CONSULTATION    CONSULT DATE:  2021    REASON FOR CONSULTATION:  Suspected metastatic lung cancer. CONSULTING PROVIDER:  Leeroy Cole MD    HISTORY OF PRESENT ILLNESS:  The patient is a pleasant 57-year-old  gentleman who presented to the hospital due to having multiple  nonspecific complaints lately and outpatient blood work showing a  calcium of 14.1. In terms of his recent history, he has been having a  number of complaints including some left back pain and right shoulder  pain as well as just generalized weakness. He thought he might have  COVID and was ruled out for that last week. He has had some decreased  appetite and some increased dyspnea on exertion and even a bit of  hoarseness. He denies any hemoptysis. A CT of the chest was done that  showed an 8 cm left lower lobe mass with left hilar adenopathy and  multiple lytic bone lesions. Therefore, Oncology was consulted for  further workup and management. PAST MEDICAL HISTORY:  1. COPD. 2.  GERD. 3.  Hypertension. 4.  Hyperlipidemia. 5.  DVT in 2017. 6.  History of C. diff colitis. 7.  Coronary artery disease status post stent. PAST SURGICAL HISTORY:  1. Cardiac cath. 2.  Cataract. 3.  Coronary artery stent. 4.  Total hip arthroplasty. ALLERGIES:  He has no known drug allergies. MEDICINES:  Lipitor 40 mg p.o. daily, metoprolol XL 50 mg p.o. daily,  Spiriva. SOCIAL HISTORY:  He is . He lives in [facility]. He is retired. He does admit to asbestos exposure.   He quit smoking 35 years right hepatic lobe that is indeterminate. I ordered a brain MRI  for staging purposes. We will need an outpatient PET CT. The next step  is to get a biopsy. Pulmonary has been consulted and will determine if  a biopsy is best done by an EBUS versus a CT-guided biopsy. This will  likely be done tomorrow. I did explain that if he has metastatic lung  cancer (whether it is a small cell or non-small cell), one of the  standards of care right now is chemotherapy combined with immunotherapy  followed by immunotherapy maintenance. I will have next generation  sequencing done on the tumor specimen to see if he has any actionable  mutations and check his PDL1 status. Another option for non-small cell  lung cancer is up-front Yervoy plus Opdivo. I explained the potential  side effects of chemotherapy in general including nausea, vomiting,  hearing loss, hair loss, renal failure, neuropathy, life-threatening  infections, and even death. I explained the potential side effects of  immunotherapy including autoimmune pneumonitis, colitis, encephalitis,  etc. - all of which are potentially life-threatening but are usually  reversible with steroids. In general, the median survival without  treatment for metastatic lung cancer is less than six months and is 12  months with treatment. There is a lot of variability. Certainly,  people who respond well to autoimmune therapy can live much longer than  that. We will obtain the biopsy and go from there. 2.  Pain control. This is adequate. Thank you for the consultation. I will follow closely.         Eric Polanco MD    D: 11/16/2021 11:39:51       T: 11/16/2021 14:29:08     [email or web address]  Job#: [id number]     Doc#: [id number]    CC:  Uma Ramos MD

## 2021-11-16 NOTE — H&P
[address] [facility] Internal Medicine  History and Physical      CHIEF COMPLAINT:  My calcium is high    History of Present Illness: This is a 82-year-old white male with a history of COPD, coronary disease, hypertension and hyperlipidemia who presented to the outpatient office initially approximately 2 weeks ago with left lower back pain and sciatica. Patient was treated with steroids and muscle relaxants. He had no immediate effects and continued to overall not feel well. He presented back to the outpatient office yesterday with a 2-week history of just malaise and chills and feeling feverish without a fever above 99.9. He has had some anorexia and constipation as well. Labs revealed hypercalcemia with a calcium of 14.1. He was sent to emergency room last night and his calcium was repeated at 14. His creatinine is 1.3 which is up from a baseline of 0.7. Patient was admitted overnight for IV hydration. In addition, the ER work-up revealed an 8 cm left lower lobe mass with extension across the major fissure into the upper lobe as well as hilar adenopathy and lytic bone lesions. This has been discussed with the patient and family at the bedside this morning. Pulmonary and oncology have been consulted this morning and the patient is now n.p.o. in case bronchoscopy is an option later today.         Past Medical History:   Diagnosis Date    C. difficile colitis 02/2017    cefuroxime    CAD (coronary artery disease) 2002    Stent to [facility])    COPD (chronic obstructive pulmonary disease) ([facility] [address]     GERD (gastroesophageal reflux disease)     Hyperlipidemia     Hypertension     Venous thrombosis of leg 03/2017    Occurred after a plane flight to [place]- Left saphenous vein extending to the saphenofemoral junction         Past Surgical History:   Procedure Laterality Date    CARDIAC CATHETERIZATION      NL 2/2007    CATARACT REMOVAL Left 2014    CATARACT REMOVAL Right 05/2017    COLONOSCOPY  08/2005 NL (Dr. Concepcion Landis)    COLONOSCOPY  2016    Joslyn Payam- polyps, repeat 3 years    COLONOSCOPY  2020    Stotz-normal colon, repeat in 5 years    COLONOSCOPY  2020    Stotz-normal, repeat in 5 years    CORONARY ANGIOPLASTY WITH STENT PLACEMENT      LAD-2002-Dr. Deanne Carmona HAND SURGERY  's   [place]      RT Shoulder , [address]  RT-       Medications Prior to Admission:    Medications Prior to Admission: celecoxib (CELEBREX) 100 MG capsule, Take 1 capsule by mouth 2 times daily  atorvastatin (LIPITOR) 40 MG tablet, TAKE 1 TABLET EVERY EVENING  metoprolol succinate (TOPROL XL) 50 MG extended release tablet, TAKE 1 TABLET EVERY DAY  aspirin 81 MG tablet, Take 81 mg by mouth daily  tiZANidine (ZANAFLEX) 2 MG tablet, Take 1 tablet by mouth 3 times daily as needed (back pain)  fluticasone-umeclidin-vilant (TRELEGY ELLIPTA) 100-62.5-25 MCG/INH AEPB, Inhale 1 puff into the lungs daily    Allergies:    Patient has no known allergies. Social History:    reports that he quit smoking about 30-40 years ago. He has a 25-30 pack-year smoking history. He has never used smokeless tobacco. He reports current alcohol use of 1-2 beers daily. He reports that he does not use drugs. Family History:   family history includes Heart Disease in his father; Lupus in his daughter; Other in his child; Parkinsonism in his mother. REVIEW OF SYSTEMS:  As above in the HPI, otherwise negative    PHYSICAL EXAM:    Vitals:  BP (!) 160/83   Pulse 81   Temp 98.2 °F (36.8 °C) (Oral)   Resp 18   Ht 5' 8\" (1.727 m)   Wt 166 lb 7.2 oz (75.5 kg)   SpO2 94%   BMI 25.31 kg/m²   Temp  Av.8 °F (36.6 °C)  Min: 97.4 °F (36.3 °C)  Max: 98.2 °F (36.8 °C)    General:  Awake, alert, oriented X 3. Well developed, well nourished. No apparent distress. HEENT:  Normocephalic, atraumatic. Pupils equal, round, reactive to light. No scleral icterus. No conjunctival injection.   Normal lips, teeth, and gums. No nasal discharge. Neck:  Supple. No carotid bruit, carotid upstroke normal.  No cervical adenopathy. Heart:  RRR, no murmurs, gallops, or rubs. PMI non-displaced  Lungs:  CTA bilaterally, bilat symmetrical expansion, no wheeze, rales, or rhonchi. Respirations easy. Abdomen: Bowel sounds present, normoactive. Soft, nontender/nondistended. No masses, no peritoneal signs. Extremities:  No clubbing, cyanosis, or edema  Skin:  Warm and dry, no open lesions or rash. Neuro:  Cranial nerves 2-12 intact, no focal deficits. Moves all extremities without difficulty.     LABS reviewed:    Recent Results (from the past 24 hour(s))   CBC Auto Differential    Collection Time: 11/15/21  2:41 PM   Result Value Ref Range    WBC 9.2 4.0 - 11.0 K/uL    RBC 5.04 4.20 - 5.90 M/uL    Hemoglobin 14.4 13.5 - 17.5 g/dL    Hematocrit 43.5 40.5 - 52.5 %    MCV 86.4 80.0 - 100.0 fL    MCH 28.7 26.0 - 34.0 pg    MCHC 33.2 31.0 - 36.0 g/dL    RDW 13.2 12.4 - 15.4 %    Platelets 886 202 - 452 K/uL    MPV 8.6 5.0 - 10.5 fL    PLATELET SLIDE REVIEW Adequate     SLIDE REVIEW see below     Neutrophils % 78.0 %    Lymphocytes % 7.0 %    Monocytes % 6.0 %    Eosinophils % 2.0 %    Basophils % 0.0 %    Neutrophils Absolute 7.8 (H) 1.7 - 7.7 K/uL    Lymphocytes Absolute 0.6 (L) 1.0 - 5.1 K/uL    Monocytes Absolute 0.6 0.0 - 1.3 K/uL    Eosinophils Absolute 0.2 0.0 - 0.6 K/uL    Basophils Absolute 0.0 0.0 - 0.2 K/uL    Bands Relative 4 0 - 7 %    Metamyelocytes Relative 3 (A) %   Comprehensive Metabolic Panel    Collection Time: 11/15/21  2:41 PM   Result Value Ref Range    Sodium 141 136 - 145 mmol/L    Potassium 4.4 3.5 - 5.1 mmol/L    Chloride 98 (L) 99 - 110 mmol/L    CO2 27 21 - 32 mmol/L    Anion Gap 16 3 - 16    Glucose 137 (H) 70 - 99 mg/dL    BUN 26 (H) 7 - 20 mg/dL    CREATININE 1.3 0.8 - 1.3 mg/dL    GFR Non-African American 54 (A) >60    GFR African American >60 >60    Calcium 14.1 (HH) 8.3 - 10.6 mg/dL Total Protein 7.3 6.4 - 8.2 g/dL    Albumin 4.4 3.4 - 5.0 g/dL    Albumin/Globulin Ratio 1.5 1.1 - 2.2    Total Bilirubin 0.3 0.0 - 1.0 mg/dL    Alkaline Phosphatase 129 40 - 129 U/L    ALT 22 10 - 40 U/L    AST 39 (H) 15 - 37 U/L   TSH without Reflex    Collection Time: 11/15/21  2:41 PM   Result Value Ref Range    TSH 1.99 0.27 - 4.20 uIU/mL   Sedimentation Rate    Collection Time: 11/15/21  2:41 PM   Result Value Ref Range    Sed Rate 51 (H) 0 - 20 mm/Hr   C-Reactive Protein    Collection Time: 11/15/21  2:41 PM   Result Value Ref Range    CRP 78.6 (H) 0.0 - 5.1 mg/L   CK    Collection Time: 11/15/21  2:41 PM   Result Value Ref Range    Total  39 - 308 U/L   Urinalysis Reflex to Culture    Collection Time: 11/15/21  2:41 PM   Result Value Ref Range    Color, UA YELLOW Straw/Yellow    Clarity, UA CLOUDY (A) Clear    Glucose, Ur Negative Negative mg/dL    Bilirubin Urine Negative Negative    Ketones, Urine Negative Negative mg/dL    Specific Gravity, UA 1.013 1.005 - 1.030    Blood, Urine Negative Negative    pH, UA 6.5 5.0 - 8.0    Protein, UA Negative Negative mg/dL    Urobilinogen, Urine 0.2 <2.0 E.U./dL    Nitrite, Urine Negative Negative    Leukocyte Esterase, Urine Negative Negative    Microscopic Examination YES     Urine Type Cleancatch     Urine Reflex to Culture Not Indicated    Microscopic Urinalysis    Collection Time: 11/15/21  2:41 PM   Result Value Ref Range    Mucus, UA Rare (A) None Seen /LPF    Bacteria, UA 2+ (A) None Seen /HPF    Urinalysis Comments see below     Hyaline Casts, UA 4 0 - 8 /LPF    WBC, UA 5 0 - 5 /HPF    RBC, UA 3 0 - 4 /HPF    Epithelial Cells, UA 5 0 - 5 /HPF   EKG 12 Lead    Collection Time: 11/16/21 12:52 AM   Result Value Ref Range    Ventricular Rate 89 BPM    Atrial Rate 89 BPM    P-R Interval 158 ms    QRS Duration 84 ms    Q-T Interval 348 ms    QTc Calculation (Bazett) 423 ms    P Axis 44 degrees    R Axis -29 degrees    T Axis 42 degrees    Diagnosis       Normal sinus rhythmNormal ECGNo previous ECGs available   Magnesium    Collection Time: 11/16/21 12:58 AM   Result Value Ref Range    Magnesium 2.40 1.80 - 2.40 mg/dL   Comprehensive Metabolic Panel    Collection Time: 11/16/21 12:58 AM   Result Value Ref Range    Sodium 137 136 - 145 mmol/L    Potassium 4.3 3.5 - 5.1 mmol/L    Chloride 94 (L) 99 - 110 mmol/L    CO2 28 21 - 32 mmol/L    Anion Gap 15 3 - 16    Glucose 221 (H) 70 - 99 mg/dL    BUN 34 (H) 7 - 20 mg/dL    CREATININE 1.3 0.8 - 1.3 mg/dL    GFR Non-African American 54 (A) >60    GFR African American >60 >60    Calcium 14.0 (HH) 8.3 - 10.6 mg/dL    Total Protein 7.0 6.4 - 8.2 g/dL    Albumin 3.9 3.4 - 5.0 g/dL    Albumin/Globulin Ratio 1.3 1.1 - 2.2    Total Bilirubin 0.3 0.0 - 1.0 mg/dL    Alkaline Phosphatase 118 40 - 129 U/L    ALT 21 10 - 40 U/L    AST 29 15 - 37 U/L   CBC Auto Differential    Collection Time: 11/16/21 12:58 AM   Result Value Ref Range    WBC 10.4 4.0 - 11.0 K/uL    RBC 4.65 4.20 - 5.90 M/uL    Hemoglobin 13.5 13.5 - 17.5 g/dL    Hematocrit 39.8 (L) 40.5 - 52.5 %    MCV 85.7 80.0 - 100.0 fL    MCH 29.0 26.0 - 34.0 pg    MCHC 33.8 31.0 - 36.0 g/dL    RDW 13.4 12.4 - 15.4 %    Platelets 489 312 - 544 K/uL    MPV 8.3 5.0 - 10.5 fL    Neutrophils % 88.1 %    Lymphocytes % 8.8 %    Monocytes % 2.5 %    Eosinophils % 0.1 %    Basophils % 0.5 %    Neutrophils Absolute 9.2 (H) 1.7 - 7.7 K/uL    Lymphocytes Absolute 0.9 (L) 1.0 - 5.1 K/uL    Monocytes Absolute 0.3 0.0 - 1.3 K/uL    Eosinophils Absolute 0.0 0.0 - 0.6 K/uL    Basophils Absolute 0.1 0.0 - 0.2 K/uL   T3, Free    Collection Time: 11/16/21 12:58 AM   Result Value Ref Range    T3, Free 1.8 (L) 2.3 - 4.2 pg/mL   TSH with Reflex    Collection Time: 11/16/21 12:58 AM   Result Value Ref Range    TSH 1.19 0.27 - 4.20 uIU/mL   Lactate, Sepsis    Collection Time: 11/16/21  7:24 AM   Result Value Ref Range    Lactic Acid, Sepsis 1.8 0.4 - 1.9 mmol/L       ASSESSMENT:      Principal Problem:     Mass of left lung  Active Problems:    Hypercalcemia of malignancy    Hyperglycemia    COPD, mild (HCC)    HTN    CAD    PLAN:    Patient has been admitted and IV hydration has been instituted. Repeat calcium is pending for this morning and saline will be continued at 200 mL/h initially. Zoledronic acid has been administered and we will consider calcitonin administration if the calcium is not improving. Pulmonary and oncology have been consulted. The case has been discussed with Dr. Laura Webber. No medications have been reordered. The case has been discussed extensively with the patient and with his family at the bedside.     Please note that over 40 minutes was spent in evaluating the patient, review of records and results, discussion with staff/family, etc.    Kassandra Mercedes MD  7:58 AM  11/16/2021

## 2021-11-16 NOTE — PROGRESS NOTES
Patient arrived to 67 from ED via stretcher with wife at bedside. Patient is A/O x 4, VSS and denies any c/o pain at this time. Patient placed in droplet plus isolation precautions per order for COVID rule out. Patient has a pending COVID test that was taken in the ED earlier in the evening. This RN attempted to educate both patient and his wife on our policy for droplet plus isolation precautions for a pending COVID test. However, wife at bedside was resistant to education and refused to leave the room. Wife stated, \"we are all vaccinated. I mean we just found out he has cancer a couple hours ago. I do not care what the policy is, I am not leaving. \"  Wife also stated that another family member was coming in at 07:00 AM to meet with a physician to discuss plan of care regardless if he was in droplet plus precautions or not. Charge RN notified. Skin assessment completed. Please see 4 eyes skin note and skin documentation. Bed locked in lowest position, non-skid socks are on and call light/belongings are within reach. Patient placed on tele monitor as well. Patient and wife both oriented to room, call light and hourly rounding. Deny any further questions at this time. Will review orders and continue to monitor.     Ekaterina Boone RN 11/16/2021 5:23 AM

## 2021-11-17 ENCOUNTER — ANESTHESIA EVENT (OUTPATIENT)
Dept: ENDOSCOPY | Age: 76
DRG: 167 | End: 2021-11-17
Payer: MEDICARE

## 2021-11-17 LAB
ALBUMIN SERPL-MCNC: 3.6 G/DL (ref 3.4–5)
ALP BLD-CCNC: 107 U/L (ref 40–129)
ALT SERPL-CCNC: 20 U/L (ref 10–40)
ANION GAP SERPL CALCULATED.3IONS-SCNC: 11 MMOL/L (ref 3–16)
AST SERPL-CCNC: 26 U/L (ref 15–37)
BANDED NEUTROPHILS RELATIVE PERCENT: 12 % (ref 0–7)
BASOPHILS ABSOLUTE: 0 K/UL (ref 0–0.2)
BASOPHILS RELATIVE PERCENT: 0 %
BILIRUB SERPL-MCNC: 0.3 MG/DL (ref 0–1)
BILIRUBIN DIRECT: <0.2 MG/DL (ref 0–0.3)
BILIRUBIN, INDIRECT: ABNORMAL MG/DL (ref 0–1)
BUN BLDV-MCNC: 28 MG/DL (ref 7–20)
CALCIUM SERPL-MCNC: 10.4 MG/DL (ref 8.3–10.6)
CHLORIDE BLD-SCNC: 103 MMOL/L (ref 99–110)
CO2: 24 MMOL/L (ref 21–32)
CREAT SERPL-MCNC: 0.9 MG/DL (ref 0.8–1.3)
EOSINOPHILS ABSOLUTE: 0 K/UL (ref 0–0.6)
EOSINOPHILS RELATIVE PERCENT: 0 %
GFR AFRICAN AMERICAN: >60
GFR NON-AFRICAN AMERICAN: >60
GLUCOSE BLD-MCNC: 125 MG/DL (ref 70–99)
GLUCOSE BLD-MCNC: 129 MG/DL (ref 70–99)
GLUCOSE BLD-MCNC: 131 MG/DL (ref 70–99)
GLUCOSE BLD-MCNC: 151 MG/DL (ref 70–99)
GLUCOSE BLD-MCNC: 177 MG/DL (ref 70–99)
HCT VFR BLD CALC: 37.4 % (ref 40.5–52.5)
HEMOGLOBIN: 12.3 G/DL (ref 13.5–17.5)
LYMPHOCYTES ABSOLUTE: 0.8 K/UL (ref 1–5.1)
LYMPHOCYTES RELATIVE PERCENT: 7 %
MAGNESIUM: 2.6 MG/DL (ref 1.8–2.4)
MCH RBC QN AUTO: 28.2 PG (ref 26–34)
MCHC RBC AUTO-ENTMCNC: 32.8 G/DL (ref 31–36)
MCV RBC AUTO: 86 FL (ref 80–100)
MONOCYTES ABSOLUTE: 0.4 K/UL (ref 0–1.3)
MONOCYTES RELATIVE PERCENT: 3 %
NEUTROPHILS ABSOLUTE: 10.8 K/UL (ref 1.7–7.7)
NEUTROPHILS RELATIVE PERCENT: 78 %
PARATHYROID HORMONE INTACT: 9.4 PG/ML (ref 14–72)
PDW BLD-RTO: 13.4 % (ref 12.4–15.4)
PERFORMED ON: ABNORMAL
PLATELET # BLD: 173 K/UL (ref 135–450)
PMV BLD AUTO: 8.6 FL (ref 5–10.5)
POTASSIUM SERPL-SCNC: 4.2 MMOL/L (ref 3.5–5.1)
RBC # BLD: 4.35 M/UL (ref 4.2–5.9)
RBC # BLD: NORMAL 10*6/UL
SODIUM BLD-SCNC: 138 MMOL/L (ref 136–145)
TOTAL PROTEIN: 6.2 G/DL (ref 6.4–8.2)
WBC # BLD: 12 K/UL (ref 4–11)

## 2021-11-17 PROCEDURE — 2580000003 HC RX 258: Performed by: INTERNAL MEDICINE

## 2021-11-17 PROCEDURE — 36415 COLL VENOUS BLD VENIPUNCTURE: CPT

## 2021-11-17 PROCEDURE — 6370000000 HC RX 637 (ALT 250 FOR IP): Performed by: INTERNAL MEDICINE

## 2021-11-17 PROCEDURE — 6360000002 HC RX W HCPCS: Performed by: INTERNAL MEDICINE

## 2021-11-17 PROCEDURE — 99232 SBSQ HOSP IP/OBS MODERATE 35: CPT | Performed by: INTERNAL MEDICINE

## 2021-11-17 PROCEDURE — 83735 ASSAY OF MAGNESIUM: CPT

## 2021-11-17 PROCEDURE — 80048 BASIC METABOLIC PNL TOTAL CA: CPT

## 2021-11-17 PROCEDURE — 94761 N-INVAS EAR/PLS OXIMETRY MLT: CPT

## 2021-11-17 PROCEDURE — 94640 AIRWAY INHALATION TREATMENT: CPT

## 2021-11-17 PROCEDURE — 1200000000 HC SEMI PRIVATE

## 2021-11-17 PROCEDURE — 99233 SBSQ HOSP IP/OBS HIGH 50: CPT | Performed by: INTERNAL MEDICINE

## 2021-11-17 PROCEDURE — 85025 COMPLETE CBC W/AUTO DIFF WBC: CPT

## 2021-11-17 PROCEDURE — 80076 HEPATIC FUNCTION PANEL: CPT

## 2021-11-17 RX ORDER — LOSARTAN POTASSIUM 50 MG/1
50 TABLET ORAL DAILY
Status: DISCONTINUED | OUTPATIENT
Start: 2021-11-17 | End: 2021-11-18 | Stop reason: HOSPADM

## 2021-11-17 RX ORDER — DEXAMETHASONE SODIUM PHOSPHATE 10 MG/ML
6 INJECTION INTRAMUSCULAR; INTRAVENOUS EVERY 12 HOURS
Status: DISCONTINUED | OUTPATIENT
Start: 2021-11-17 | End: 2021-11-18 | Stop reason: HOSPADM

## 2021-11-17 RX ORDER — PANTOPRAZOLE SODIUM 40 MG/1
40 TABLET, DELAYED RELEASE ORAL
Status: DISCONTINUED | OUTPATIENT
Start: 2021-11-18 | End: 2021-11-18 | Stop reason: HOSPADM

## 2021-11-17 RX ORDER — OXYCODONE HYDROCHLORIDE 5 MG/1
5 TABLET ORAL
Status: DISCONTINUED | OUTPATIENT
Start: 2021-11-17 | End: 2021-11-18 | Stop reason: HOSPADM

## 2021-11-17 RX ORDER — ACETAMINOPHEN 500 MG
1000 TABLET ORAL EVERY 6 HOURS PRN
Status: DISCONTINUED | OUTPATIENT
Start: 2021-11-17 | End: 2021-11-18 | Stop reason: HOSPADM

## 2021-11-17 RX ADMIN — MAGNESIUM HYDROXIDE 30 ML: 400 SUSPENSION ORAL at 21:13

## 2021-11-17 RX ADMIN — OXYCODONE 5 MG: 5 TABLET ORAL at 18:23

## 2021-11-17 RX ADMIN — DEXAMETHASONE SODIUM PHOSPHATE 6 MG: 10 INJECTION INTRAMUSCULAR; INTRAVENOUS at 21:13

## 2021-11-17 RX ADMIN — ACETAMINOPHEN 1000 MG: 500 TABLET ORAL at 04:47

## 2021-11-17 RX ADMIN — ACETAMINOPHEN 1000 MG: 500 TABLET ORAL at 21:13

## 2021-11-17 RX ADMIN — SODIUM CHLORIDE: 9 INJECTION, SOLUTION INTRAVENOUS at 06:45

## 2021-11-17 RX ADMIN — METOPROLOL SUCCINATE 50 MG: 50 TABLET, EXTENDED RELEASE ORAL at 09:35

## 2021-11-17 RX ADMIN — MAGNESIUM CITRATE 296 ML: 1.75 LIQUID ORAL at 09:35

## 2021-11-17 RX ADMIN — BUDESONIDE AND FORMOTEROL FUMARATE DIHYDRATE 1 PUFF: 160; 4.5 AEROSOL RESPIRATORY (INHALATION) at 21:02

## 2021-11-17 RX ADMIN — BUDESONIDE AND FORMOTEROL FUMARATE DIHYDRATE 1 PUFF: 160; 4.5 AEROSOL RESPIRATORY (INHALATION) at 09:51

## 2021-11-17 RX ADMIN — INSULIN LISPRO 2 UNITS: 100 INJECTION, SOLUTION INTRAVENOUS; SUBCUTANEOUS at 12:40

## 2021-11-17 RX ADMIN — DEXAMETHASONE SODIUM PHOSPHATE 6 MG: 10 INJECTION INTRAMUSCULAR; INTRAVENOUS at 04:47

## 2021-11-17 RX ADMIN — TIOTROPIUM BROMIDE INHALATION SPRAY 2 PUFF: 3.12 SPRAY, METERED RESPIRATORY (INHALATION) at 09:51

## 2021-11-17 RX ADMIN — LOSARTAN POTASSIUM 50 MG: 50 TABLET, FILM COATED ORAL at 12:21

## 2021-11-17 RX ADMIN — INSULIN LISPRO 2 UNITS: 100 INJECTION, SOLUTION INTRAVENOUS; SUBCUTANEOUS at 17:15

## 2021-11-17 RX ADMIN — ATORVASTATIN CALCIUM 40 MG: 40 TABLET, FILM COATED ORAL at 09:35

## 2021-11-17 RX ADMIN — DEXAMETHASONE SODIUM PHOSPHATE 6 MG: 10 INJECTION INTRAMUSCULAR; INTRAVENOUS at 10:20

## 2021-11-17 RX ADMIN — OXYCODONE 5 MG: 5 TABLET ORAL at 12:21

## 2021-11-17 ASSESSMENT — PAIN DESCRIPTION - PROGRESSION
CLINICAL_PROGRESSION: NOT CHANGED
CLINICAL_PROGRESSION: GRADUALLY WORSENING

## 2021-11-17 ASSESSMENT — PAIN - FUNCTIONAL ASSESSMENT
PAIN_FUNCTIONAL_ASSESSMENT: PREVENTS OR INTERFERES SOME ACTIVE ACTIVITIES AND ADLS
PAIN_FUNCTIONAL_ASSESSMENT: ACTIVITIES ARE NOT PREVENTED

## 2021-11-17 ASSESSMENT — PAIN SCALES - GENERAL
PAINLEVEL_OUTOF10: 0
PAINLEVEL_OUTOF10: 5
PAINLEVEL_OUTOF10: 4
PAINLEVEL_OUTOF10: 5
PAINLEVEL_OUTOF10: 5
PAINLEVEL_OUTOF10: 3

## 2021-11-17 ASSESSMENT — PAIN DESCRIPTION - FREQUENCY
FREQUENCY: INTERMITTENT
FREQUENCY: INTERMITTENT

## 2021-11-17 ASSESSMENT — PAIN DESCRIPTION - ONSET
ONSET: ON-GOING
ONSET: ON-GOING

## 2021-11-17 ASSESSMENT — PAIN DESCRIPTION - DESCRIPTORS
DESCRIPTORS: ACHING
DESCRIPTORS: ACHING
DESCRIPTORS: ACHING;DISCOMFORT

## 2021-11-17 ASSESSMENT — PAIN DESCRIPTION - PAIN TYPE
TYPE: ACUTE PAIN

## 2021-11-17 ASSESSMENT — PAIN DESCRIPTION - LOCATION
LOCATION: BACK
LOCATION: BACK
LOCATION: GENERALIZED

## 2021-11-17 ASSESSMENT — PAIN DESCRIPTION - ORIENTATION: ORIENTATION: LOWER

## 2021-11-17 NOTE — PROGRESS NOTES
Pulmonary Progress Note    Date of Admission: 11/16/2021   LOS: 1 day     Chief Complaint   Patient presents with    Fatigue    Generalized Body Aches     x 2 weeks/ negative covid on sat    Other     sent by MD for abnormal lab results       Assessment/Plan:     Lung mass  -Evidence of brain mets, possible liver mass  -Plan for EBUS tomorrow for tissue sampling  -N.p.o. after midnight     COPD  -On Symbicort and Spiriva     Hypercalcemia  -Concern for hypercalcemia of malignancy  -Receive Zometa, on IV fluids  -Check PTH RP        24 Hour Events/Subjective  No acute events overnight. Patient asymptomatic    ROS:   No nausea  No Vomiting  No chest pain    No intake or output data in the 24 hours ending 11/17/21 0824      PHYSICAL EXAM:   Blood pressure (!) 172/86, pulse 63, temperature 98 °F (36.7 °C), temperature source Oral, resp. rate 16, height 5' 8\" (1.727 m), weight 174 lb 13.2 oz (79.3 kg), SpO2 92 %.'  Gen:  No acute distress. Eyes: PERRL. Anicteric sclera. No conjunctival injection. ENT: No discharge. Posterior oropharynx clear. External appearance of ears and nose normal.  Neck: Trachea midline. No mass   Resp:  No crackles. No wheezes. No rhonchi. No dullness on percussion. CV: Regular rate. Regular rhythm. No murmur or rub. No edema. GI: Soft, Non-tender. Non-distended. +BS  Skin: Warm, dry, w/o erythema. Lymph: No cervical or supraclavicular LAD. M/S: No cyanosis. No clubbing. Neuro:  no focal neurologic deficit. Moves all extremities  Psych: Awake and alert, Oriented x 3. Judgement and insight appropriate. Mood stable.       Medications:    Scheduled Meds:   magnesium citrate  296 mL Oral Once    atorvastatin  40 mg Oral Daily    metoprolol succinate  50 mg Oral Daily    budesonide-formoterol  1 puff Inhalation BID    tiotropium  2 puff Inhalation Daily    insulin lispro  0-12 Units SubCUTAneous TID WC    insulin lispro  0-6 Units SubCUTAneous Nightly    dexamethasone  6 mg IntraVENous Q6H       Continuous Infusions:   sodium chloride 150 mL/hr at 11/17/21 0645    dextrose         PRN Meds:  acetaminophen, oxyCODONE, glucose, dextrose, glucagon (rDNA), dextrose, magnesium hydroxide    Labs reviewed:  CBC:   Recent Labs     11/15/21  1441 11/16/21  0058   WBC 9.2 10.4   HGB 14.4 13.5   HCT 43.5 39.8*   MCV 86.4 85.7    171     BMP:   Recent Labs     11/15/21  1441 11/16/21 0058 11/16/21  0724    137 138   K 4.4 4.3 4.4   CL 98* 94* 100   CO2 27 28 28   BUN 26* 34* 35*   CREATININE 1.3 1.3 1.2     LIVER PROFILE:   Recent Labs     11/15/21  1441 11/16/21  0058   AST 39* 29   ALT 22 21   BILITOT 0.3 0.3   ALKPHOS 129 118     PT/INR: No results for input(s): PROTIME, INR in the last 72 hours. APTT: No results for input(s): APTT in the last 72 hours. UA:  Recent Labs     11/15/21  1441   COLORU YELLOW   PHUR 6.5   WBCUA 5   RBCUA 3   MUCUS Rare*   BACTERIA 2+*   CLARITYU CLOUDY*   SPECGRAV 1.013   LEUKOCYTESUR Negative   UROBILINOGEN 0.2   BILIRUBINUR Negative   BLOODU Negative   GLUCOSEU Negative     No results for input(s): PH, PCO2, PO2 in the last 72 hours. Films:  Radiology Review:  Pertinent images / reports were reviewed as a part of this visit. MRI BRAIN W WO CONTRAST  Narrative: EXAMINATION:  MRI OF THE BRAIN WITHOUT AND WITH CONTRAST,  11/16/2021 5:05 pm    TECHNIQUE:  Multiplanar multisequence MRI of the head/brain was performed without and  with the administration of intravenous contrast.    COMPARISON:  None    HISTORY:  ORDERING SYSTEM PROVIDED HISTORY:  Staging lung cancer    TECHNOLOGIST PROVIDED HISTORY:    Reason for Exam:  Staging lung cancer    Reason for Exam:  Staging lung cancer    Acuity:  Acute    Type of Exam:  Initial    FINDINGS:  INTRACRANIAL STRUCTURES/VENTRICLES:  There is no acute infarct. No mass  effect or midline shift. No evidence of an acute intracranial hemorrhage.   The ventricles and sulci are normal in size and configuration for the  patient's age. Minor periventricular subcortical T2 prolongation suggestive  of chronic microvascular disease. There is vasogenic edema identified in the  right frontal lobe with a corresponding 6 mm focus of enhancement identified  in this location. Extradural thickening on the right frontal region noted. It measures 4.8 cm in AP dimension. The sellar/suprasellar regions appear  unremarkable. The normal signal voids within the major intracranial vessels  appear maintained. No abnormal focus of enhancement is seen within the brain. ORBITS: The visualized portion of the orbits demonstrate no acute abnormality. SINUSES: Mild paranasal sinus mucosal thickening. BONES/SOFT TISSUES: There is a right posterior frontal calvarial metastases  with extensions to the inner and outer table and evidence of dural thickening  on the right. This measures up to 4.8 cm in length intracranially. Impression: 6 mm metastasis right frontal lobe with corresponding vasogenic edema. Right frontal calvarial metastases involving the inner and outer table with  evidence of dural thickening and dural based metastases. Otherwise mild chronic microvascular disease. The findings were sent to the Radiology Results [address] at 6:37  pm on 11/16/2021 to be communicated to a licensed caregiver. CT ABDOMEN PELVIS WO CONTRAST Additional Contrast? Radiologist Recommendation  Narrative: EXAMINATION:  CT OF THE ABDOMEN AND PELVIS WITHOUT CONTRAST 11/16/2021 9:01 am    TECHNIQUE:  CT of the abdomen and pelvis was performed without the administration of  intravenous contrast. Multiplanar reformatted images are provided for review. Dose modulation, iterative reconstruction, and/or weight based adjustment of  the mA/kV was utilized to reduce the radiation dose to as low as reasonably  achievable.     COMPARISON:  Chest CT on the same date    HISTORY:  ORDERING SYSTEM PROVIDED HISTORY: linsey metastases  TECHNOLOGIST PROVIDED HISTORY:  Reason for exam:->eval metastases  Additional Contrast?->Radiologist Recommendation  Reason for Exam: eval metastases  Acuity: Acute  Type of Exam: Initial    FINDINGS:  Lower Chest:  Partial visualization of a mass in the left lower lobe  characterized on prior CT. Trace pleural thickening and fluid. Organs: Evaluation of metastatic disease limited on this noncontrast exam.  Questionable focus of low attenuation in segment 6 that cannot be  characterized. The gallbladder, biliary ducts, pancreas and spleen appear  normal.  Normal adrenal glands. The kidneys are also normal.    GI/Bowel: The stomach is normal.  Duodenal diverticulum in the 3rd portion. Small bowel is normal.  A normal appendix is visualized. The colon is normal.    Pelvis: The bladder is unremarkable. Normal prostate. Peritoneum/Retroperitoneum: Advanced atherosclerotic calcification of the  aorta. Fusiform aneurysm at the distal aorta measuring up to 3.4 cm. No  free air or fluid. No adenopathy. Bones/Soft Tissues: No skeletal lesions. Impression: 1. Subtle area of low attenuation in the right hepatic lobe that cannot be  characterized on this noncontrast exam.  Given pulmonary findings, recommend  follow-up MRI of the abdomen with and without contrast for further  evaluation. A multiphase CT may also be considered. 2. AAA measuring up to 3.4 cm. RECOMMENDATIONS:  For management of fusiform AAA:    3.0-3.4 cm AAA, recommend follow-up every 3 years. Note: Recommend Vascular consultation if a fusiform AAA enlarges by >0.5 cm  in 6 months or >1 cm in 1 year or for a saccular AAA of any size. References: Shelbi Kelly Radiol 2013; 85(42):264-028; J Vasc Surg.  2018; 67:2-77  CT CHEST WO CONTRAST  Narrative: EXAMINATION:  CT OF THE CHEST WITHOUT CONTRAST 11/16/2021 1:48 am    TECHNIQUE:  CT of the chest was performed without the administration of intravenous  contrast. Multiplanar reformatted images are provided for review. Dose  modulation, iterative reconstruction, and/or weight based adjustment of the  mA/kV was utilized to reduce the radiation dose to as low as reasonably  achievable. COMPARISON:  Chest radiograph 11/16/2021    HISTORY:  ORDERING SYSTEM PROVIDED HISTORY: potential mass vs lymphadenopathy  TECHNOLOGIST PROVIDED HISTORY:  Reason for exam:->potential mass vs lymphadenopathy  Decision Support Exception - unselect if not a suspected or confirmed  emergency medical condition->Emergency Medical Condition (MA)  Reason for Exam: potential mass vs lymphadenopathy  Acuity: Acute  Type of Exam: Initial    FINDINGS:  Mediastinum: The heart size within normal limits. No pericardial effusion. The thoracic aorta is normal caliber with mild atherosclerosis. The main  pulmonary artery is normal caliber. There is left hilar and left mediastinal  adenopathy. For example, there is a 1.9 x 1.9 cm left paratracheal lymph  node and a probable 2.6 x 1.6 cm left hilar lymph node. Lungs/pleura: There is an 8.7 x 6.8 x 6.3 cm mass centered within the left  lower lobe with extension across the major fissure into the posterior left  upper lobe. This mass closely abuts the descending aorta and likely extends  to the pleura. There is a trace left pleural effusion. The right lung is  grossly clear. The central airways are otherwise patent. No pneumothorax. No right pleural effusion. Upper Abdomen: No acute findings in the upper abdomen. Soft Tissues/Bones: Postsurgical changes are noted in the right proximal  humerus. Multiple lytic lesions are identified. For example the inferior  right scapular body which likely has a healing pathologic fracture. There is  destructive appearance of the left posterior rib as well. There is a  dominant 3.4 x 2.8 cm lytic lesion in T9 with associated pathologic fracture.   Impression: 1. 8.7 cm mass centered within the left lower lobe with extension across the  major fissure into the posterior left upper lobe. Findings are compatible  with neoplasm until proven otherwise. 2. Mediastinal and left hilar adenopathy likely metastatic in etiology. 3. Multifocal lytic metastases are noted in the visualized skeleton. There  are probable pathologic fractures involving the T9 vertebral body and right  scapular body. XR CHEST PORTABLE  Narrative: EXAMINATION:  ONE XRAY VIEW OF THE CHEST    11/16/2021 12:40 am    COMPARISON:  07/01/2020    HISTORY:  ORDERING SYSTEM PROVIDED HISTORY: other  TECHNOLOGIST PROVIDED HISTORY:  Reason for exam:->other  Reason for Exam: abnormal labs, fatique, body aches  Acuity: Acute  Type of Exam: Initial    FINDINGS:  The heart is normal.  The pulmonary vessels are normal.  There is a moderate  left perihilar fullness extending inferiorly with a questionable 8 cm rounded  mass or adenopathy in the area which is more prominent. There are mild  linear densities along the lung bases. No effusion is seen. Impression: Questionable left hilar mass or adenopathy which is more apparent. Recommend  CT correlation. Mild discoid atelectasis or scarring along the lung bases. This note was transcribed using [employer]. Please disregard any translational errors.     Thank you for this consult,    Paul Ramirez MD  [facility] Pulmonary, Critical Care, and Sleep Medicine

## 2021-11-17 NOTE — CONSULTS
Department of Radiation Oncology  Attending Consult Note      Reason for Consult:  New metastatic lung cancer  Requesting Physician:  José Miguel Nowak MD    CHIEF COMPLAINT:  Generalized fatigue    History Obtained From:  Patient and chart    HISTORY OF PRESENT ILLNESS:      The patient is a 78 y. o. with A remote roughly 20-pack-year smoking history who presented to his PCP with nonspecific complaints and was found to have hypercalcemia to 14. 1. He has generalized aches but no specific areas of pain and no areas of consistent pain. He denies any focal weakness or loss of strength. CT of the chest performed on 11/16/2021 showed an 8.7 cm mass in the left lower lobe with extension across the major fissure into the posterior left upper lobe suspicious for neoplasm. There was mediastinal and left hilar adenopathy. There were also multifocal lytic metastases and probable pathologic fractures involving the T9 vertebral body and right scapular body. CT of the abdomen pelvis was also performed on 11/16/2021 and shows a subtle area of low attenuation in the right hepatic lobe that could represent metastasis. Brain MRI on 1117 11/16/2021 showed a 6 mm right frontal lobe metastasis with minimal surrounding vasogenic edema. There was also a right frontal calvarial metastasis involving the inner and outer table. The patient was seen by Dr. Racheal Aguila with medical oncology who has discussed starting systemic therapy likely with combined chemo and immunotherapy in the near future. The patient is scheduled for biopsy with Dr. Yao Right tomorrow. Dr. Racheal Aguila plans to obtain a PET scan as an outpatient. He is also considering MRI of the thoracic spine.       Past Medical History:    Past Medical History:   Diagnosis Date    C. difficile colitis 02/2017    cefuroxime    CAD (coronary artery disease) 2002    Stent to LAD Ebenezer Rush)    COPD (chronic obstructive pulmonary disease) (HCC)     GERD (gastroesophageal reflux disease)     Hyperlipidemia     Hypertension     Venous thrombosis of leg 03/2017    Occurred after a plane flight to [place]- Left saphenous vein extending to the saphenofemoral junction       Past Surgical History:    Past Surgical History:   Procedure Laterality Date    CARDIAC CATHETERIZATION      NL 2/2007    CATARACT REMOVAL Left 2014    CATARACT REMOVAL Right 05/2017    COLONOSCOPY  08/2005    NL (Dr. Varsha Quintanilla)    COLONOSCOPY  07/08/2016    Stotz- polyps, repeat 3 years    COLONOSCOPY  01/29/2020    Stotz-normal colon, repeat in 5 years    COLONOSCOPY  01/29/2020    Stotz-normal, repeat in 5 years    CORONARY ANGIOPLASTY WITH STENT PLACEMENT      LAD-12/2002-Dr. Catheryn Barthel HAND SURGERY  1970's   [place]      RT Shoulder 1998, [facility]       Current Medications:      Current Facility-Administered Medications:     acetaminophen (TYLENOL) tablet 1,000 mg, 1,000 mg, Oral, Q6H PRN, Lex Snider MD, 1,000 mg at 11/17/21 0447    oxyCODONE (ROXICODONE) immediate release tablet 5 mg, 5 mg, Oral, Q3H PRN, Tayo Valle MD, 5 mg at 11/17/21 1221    losartan (COZAAR) tablet 50 mg, 50 mg, Oral, Daily, Rose Keith MD, 50 mg at 11/17/21 1221    atorvastatin (LIPITOR) tablet 40 mg, 40 mg, Oral, Daily, Rose Keith MD, 40 mg at 11/17/21 0935    metoprolol succinate (TOPROL XL) extended release tablet 50 mg, 50 mg, Oral, Daily, Rose Keith MD, 50 mg at 11/17/21 0935    glucose (GLUTOSE) 40 % oral gel 15 g, 15 g, Oral, PRN, Rose Keith MD    dextrose 50 % IV solution, 12.5 g, IntraVENous, PRN, Rose Keith MD    glucagon (rDNA) injection 1 mg, 1 mg, IntraMUSCular, PRN, Rose Keith MD    dextrose 5 % solution, 100 mL/hr, IntraVENous, PRN, Rose Keith MD    budesonide-formoterol [facility]) 160-4.5 MCG/ACT inhaler 1 puff, 1 puff, Inhalation, BID, [facility] Diane Blanca MD, 1 puff at 21 0951    tiotropium (SPIRIVA RESPIMAT) 2.5 MCG/ACT inhaler 2 puff, 2 puff, Inhalation, Daily, Kaylyn Damico MD, 2 puff at 21 0951    magnesium hydroxide (MILK OF MAGNESIA) 400 MG/5ML suspension 30 mL, 30 mL, Oral, Daily PRN, Michele Cuevas MD, 30 mL at 21 2144    insulin lispro (HUMALOG) injection vial 0-12 Units, 0-12 Units, SubCUTAneous, TID WC, Kaylyn Damico MD    insulin lispro (HUMALOG) injection vial 0-6 Units, 0-6 Units, SubCUTAneous, Nightly, Kaylyn Damico MD    dexamethasone (DECADRON) injection 6 mg, 6 mg, IntraVENous, Q6H, Kaylyn Damico MD, 6 mg at 21 1020    Allergies:  Review of patient's allergies indicates no known allergies. Social History:    Social History     Socioeconomic History    Marital status:      Spouse name: Not on file    Number of children: 3    Years of education: Not on file    Highest education level: Not on file   Occupational History    Not on file   Tobacco Use    Smoking status: Former Smoker     Packs/day: 0.25     Years: 10.00     Pack years: 2.50     Start date: 7/10/1979     Quit date: 1991     Years since quittin.8    Smokeless tobacco: Never Used   Substance and Sexual Activity    Alcohol use: Yes     Comment: 1-2 beers daily     Drug use: No    Sexual activity: Not on file   Other Topics Concern    Not on file   Social History Narrative    Not on file     Social Determinants of Health     Financial Resource Strain: Unknown    Difficulty of Paying Living Expenses: Patient refused   Food Insecurity: No Food Insecurity    Worried About Running Out of Food in the Last Year: Never true    Teresa of Food in the Last Year: Never true   Transportation Needs:     Lack of Transportation (Medical): Not on file    Lack of Transportation (Non-Medical):  Not on file   Physical Activity:     Days of Exercise per Week: Not on file    Minutes of Exercise per Session: Not on file   Stress:     Feeling of Stress : Not on file   Social Connections:     Frequency of Communication with Friends and Family: Not on file    Frequency of Social Gatherings with Friends and Family: Not on file    Attends Orthodox Services: Not on file    Active Member of [address] [place] [address] or Organizations: Not on file    Attends Club or Organization Meetings: Not on file    Marital Status: Not on file   Intimate Partner Violence:     Fear of Current or Ex-Partner: Not on file    Emotionally Abused: Not on file    Physically Abused: Not on file    Sexually Abused: Not on file   Housing Stability:     Unable to Pay for Housing in the Last Year: Not on file    Number of [place] in the Last Year: Not on file    Unstable Housing in the Last Year: Not on file       Family History:   Family History   Problem Relation Age of Onset    Heart Disease Father     Parkinsonism Mother     Other Child         Cardiomypathy vs Lye    Lupus Daughter        REVIEW OF SYSTEMS:    As outlined in HPI and chart review, otherwise review of general, eyes, nose, throat, pulmonary, cardiac, GI, , musculoskeletal, neurological, and integumentary systems is negative. PHYSICAL EXAM:      Vitals:    Vitals:    11/17/21 0956   BP:    Pulse:    Resp: 16   Temp:    SpO2: 92%     ECOG Performance Status (ECOG Scale 0-4):  1  GENERAL: Awake, alert, and oriented, no apparent distress  EYES: Sclera clear, conjunctiva normal  ENT: Normocephalic, atraumatic  NECK: Symmetrical, no visualized masses  LUNGS: No increased work of breathing, no audible wheezing  ABDOMEN: Non-distended, symmetrical  MUSCULOSKELETAL: normal range of motion, normal ambulation  NEUROLOGIC: alert, gross motor skills intact  SKIN: no visible jaundice, rash, or bleeding  EXTREMETIES: No visible clubbing or edema    DATA:    I personally reviewed the patient's imaging including CT and MRI.   He has what appears to be lung cancer with bone and brain metastases and potentially liver metastases. On my review it does not appear that he has leptomeningeal or dural metastatic disease but may have some local inflammatory change at the site of calvarial metastasis. IMPRESSION/RECOMMENDATIONS:      Heather Gordon Is a 79-year-old gentleman with what appears to be a likely new primary bronchogenic carcinoma with brain, bone, and possibly liver metastases, stage IV. 1.  Lung cancer: The patient will undergo biopsy tomorrow for pathologic confirmation of diagnosis. This will also allow for further tumor testing to guide further therapy. PET scan as an outpatient. I agree with MRI of the thoracic spine to evaluate and rule out any areas of impending cord compression. Brain metastasis treatment as below. 2.  Brain metastases: I reviewed images and discussed the case with Dr. Monica Monroy, gamma knife specialist in radiation oncology. We were in agreement that patient has a small frontal metastasis but no obvious leptomeningeal or dural disease with calvarial metastasis possibly causing some local inflammation or edema. The patient would be a good candidate for upfront stereotactic radiation to his isolated small frontal brain metastasis. We will refer the patient as an outpatient for gamma knife consultation. I discussed this with the patient and he was agreeable to proceed. Thank you for inviting me to participate in Mr [facility] care.     Alex Lee MD  [facility] Radiation Oncology  [phone]

## 2021-11-17 NOTE — PLAN OF CARE
Problem: Falls - Risk of:  Goal: Will remain free from falls  Description: Will remain free from falls  11/17/2021 1442 by Yue Sargent RN  Outcome: Ongoing  11/17/2021 0255 by Johnathon Etienne RN  Outcome: Ongoing     Problem: Safety:  Goal: Free from accidental physical injury  Description: Free from accidental physical injury  11/17/2021 1442 by Yue Sargent RN  Outcome: Ongoing  11/17/2021 0255 by Johnathon Etienne RN  Outcome: Ongoing     Problem: Safety:  Goal: Free from intentional harm  Description: Free from intentional harm  11/17/2021 1442 by Yue Sargent RN  Outcome: Ongoing  11/17/2021 0255 by Johnathon Etienne RN  Outcome: Ongoing     Problem: Daily Care:  Goal: Daily care needs are met  Description: Daily care needs are met  11/17/2021 1442 by Yue Sargent RN  Outcome: Ongoing  11/17/2021 0255 by Johnathon Etienne RN  Outcome: Ongoing

## 2021-11-17 NOTE — PROGRESS NOTES
[facility] Internal Medicine Note      Chief Complaint: I feel ok    Subjective/Interval History: This morning the patient is sitting up at the side of the bed. His wife and daughter and son-in-law are in the room. His spirits are good. Dr. Alana Riedel discussed the findings on brain MRI with the family already. Labs are pending this morning. Patient is still constipated. No bowel movement yet. Mag citrate has been ordered. Steroids started last night for the brain mets. Fingerstick blood sugars have been okay. Bronchoscopy scheduled for tomorrow morning. No other new problems noted. Case discussed with nursing at the bedside. No chest pain or shortness breath. No cough or sputum. No nausea, vomiting, diarrhea. No abdominal pain. No dysuria. The remainder of the review of systems is negative. PMH, PSH, FH/SH reviewed and unchanged as documented in the H&P personally documented at admission 21    Medication list reviewed    Objective:    BP (!) 172/86   Pulse 63   Temp 98 °F (36.7 °C) (Oral)   Resp 16   Ht 5' 8\" (1.727 m)   Wt 174 lb 13.2 oz (79.3 kg)   SpO2 92%   BMI 26.58 kg/m²   Temp  Av.2 °F (36.8 °C)  Min: 98 °F (36.7 °C)  Max: 98.4 °F (36.9 °C)    RRR. Telemetry with sinus rhythm. Chest-respirations are easy.   Ext- no edema    The Following Labs Were Reviewed Today:    Recent Results (from the past 24 hour(s))   POCT Glucose    Collection Time: 21 11:38 AM   Result Value Ref Range    POC Glucose 144 (H) 70 - 99 mg/dl    Performed on ACCU-CHEK    POCT Glucose    Collection Time: 21  4:47 PM   Result Value Ref Range    POC Glucose 123 (H) 70 - 99 mg/dl    Performed on ACCU-CHEK    POCT Glucose    Collection Time: 21  8:00 PM   Result Value Ref Range    POC Glucose 120 (H) 70 - 99 mg/dl    Performed on ACCU-CHEK    POCT Glucose    Collection Time: 21 11:29 PM   Result Value Ref Range    POC Glucose 119 (H) 70 - 99 mg/dl    Performed on ACCU-CHEK POCT Glucose    Collection Time: 11/17/21  8:23 AM   Result Value Ref Range    POC Glucose 129 (H) 70 - 99 mg/dl    Performed on ACCU-CHEK        ASSESSMENT/PLAN:      Principal Problem: Mass of left lung-bronchoscopy planned for tomorrow. Active Problems:    Hypercalcemia of malignancy-labs pending for today. Zometa given yesterday. Reduce IV fluid rate as calcium improves. Brain Metastasis-Decadron started last night. Radiation oncology to be consulted. Hyperglycemia-continue sliding scale insulin. Blood sugars have been okay since steroids added. Primary hypertension-blood pressure is high today. Plan to start either ARB or amlodipine depending on renal function today. Await labs. Coronary artery disease due to lipid rich plaque-asymptomatic. Continue statin therapy and beta-blocker. Plan to restart aspirin once bronchoscopy completed. COPD, mild (HCC)-continue inhaler regimen. Hyperlipidemia-stable on current statin dosing. Time > 35 minutes reviewing chart and patient data, examining and interviewing patient, and discussing with nursing staff, and family at the bedside.       Kassandra Greer MD, [address]  8:32 AM  11/17/2021

## 2021-11-17 NOTE — PLAN OF CARE
Problem: Falls - Risk of:  Goal: Will remain free from falls  Description: Will remain free from falls  Outcome: Ongoing  Goal: Absence of physical injury  Description: Absence of physical injury  Outcome: Ongoing     Problem: Nutrition  Goal: Optimal nutrition therapy  Outcome: Ongoing     Problem: Infection:  Goal: Will remain free from infection  Description: Will remain free from infection  Outcome: Ongoing     Problem: Safety:  Goal: Free from accidental physical injury  Description: Free from accidental physical injury  Outcome: Ongoing  Goal: Free from intentional harm  Description: Free from intentional harm  Outcome: Ongoing     Problem: Daily Care:  Goal: Daily care needs are met  Description: Daily care needs are met  Outcome: Ongoing     Problem: Pain:  Goal: Patient's pain/discomfort is manageable  Description: Patient's pain/discomfort is manageable  Outcome: Ongoing  Goal: Pain level will decrease  Description: Pain level will decrease  Outcome: Ongoing  Goal: Control of acute pain  Description: Control of acute pain  Outcome: Ongoing  Goal: Control of chronic pain  Description: Control of chronic pain  Outcome: Ongoing     Problem: Skin Integrity:  Goal: Skin integrity will stabilize  Description: Skin integrity will stabilize  Outcome: Ongoing     Problem: Discharge Planning:  Goal: Patients continuum of care needs are met  Description: Patients continuum of care needs are met  Outcome: Ongoing

## 2021-11-17 NOTE — PROGRESS NOTES
Hematology Oncology Daily Progress Note    Admit Date: 11/16/2021  Hospital day several    Subjective:     Patient has complaints of mild to mod fatigue and stable back pain--denies sob/cp. .   Medication side effects: none    Scheduled Meds:   losartan  50 mg Oral Daily    atorvastatin  40 mg Oral Daily    metoprolol succinate  50 mg Oral Daily    budesonide-formoterol  1 puff Inhalation BID    tiotropium  2 puff Inhalation Daily    insulin lispro  0-12 Units SubCUTAneous TID WC    insulin lispro  0-6 Units SubCUTAneous Nightly    dexamethasone  6 mg IntraVENous Q6H     Continuous Infusions:   dextrose       PRN Meds:acetaminophen, oxyCODONE, glucose, dextrose, glucagon (rDNA), dextrose, magnesium hydroxide    Review of Systems  Pertinent items are noted in HPI. REVIEW OF SYSTEMS:         · Constitutional: Denies fever, sweats, weight loss     · Eyes: No visual changes or diplopia. No scleral icterus. · ENT: No Headaches, hearing loss or vertigo. No mouth sores or sore throat. · Cardiovascular: No chest pain, dyspnea on exertion, palpitations or loss of consciousness. · Respiratory: No cough or wheezing, no sputum production. No hemoptysis. .    · Gastrointestinal: No abdominal pain, appetite loss, blood in stools. No change in bowel habits. · Genitourinary: No dysuria, trouble voiding, or hematuria. · Musculoskeletal:  Generalized weakness. No joint complaints. · Integumentary: No rash or pruritis. · Neurological: No headache, diplopia. No change in gait, balance, or coordination. No paresthesias. · Endocrine: No temperature intolerance. No excessive thirst, fluid intake, or urination. · Hematologic/Lymphatic: No abnormal bruising or ecchymoses, blood clots or swollen lymph nodes. · Allergic/Immunologic: No nasal congestion or hives.    ·     Objective:     Patient Vitals for the past 8 hrs:   BP Temp Temp src Pulse Resp SpO2   11/17/21 1358 (!) 168/71 97.9 °F (36.6 °C) Oral 65 18 92 % 11/17/21 0956 -- -- -- -- 16 92 %     I/O last 3 completed shifts: In: 600 [P.O.:600]  Out: -   No intake/output data recorded. BP (!) 168/71   Pulse 65   Temp 97.9 °F (36.6 °C) (Oral)   Resp 18   Ht 5' 8\" (1.727 m)   Wt 174 lb 13.2 oz (79.3 kg)   SpO2 92%   BMI 26.58 kg/m²     General Appearance:    Alert, cooperative, no distress, appears stated age   Head:    Normocephalic, without obvious abnormality, atraumatic   Eyes:    PERRL, conjunctiva/corneas clear, EOM's intact, fundi     benign, both eyes        Ears:    Normal TM's and external ear canals, both ears   Nose:   Nares normal, septum midline, mucosa normal, no drainage    or sinus tenderness   Throat:   Lips, mucosa, and tongue normal; teeth and gums normal   Neck:   Supple, symmetrical, trachea midline, no adenopathy;        thyroid:  No enlargement/tenderness/nodules; no carotid    bruit or JVD   Back:     Symmetric, no curvature, ROM normal, no CVA tenderness   Lungs:     Clear to auscultation bilaterally, respirations unlabored   Chest wall:    No tenderness or deformity   Heart:    Regular rate and rhythm, S1 and S2 normal, no murmur, rub   or gallop   Abdomen:     Soft, non-tender, bowel sounds active all four quadrants,     no masses, no organomegaly           Extremities:   Extremities normal, atraumatic, no cyanosis or edema   Pulses:   2+ and symmetric all extremities   Skin:   Skin color, texture, turgor normal, no rashes or lesions   Lymph nodes:   Cervical, supraclavicular, and axillary nodes normal   Neurologic:   Stable         Data Review  CBC:   Lab Results   Component Value Date    WBC 12.0 11/17/2021    RBC 4.35 11/17/2021       Assessment:     Principal Problem: Mass of left lung  Active Problems:    Hyperglycemia    Primary hypertension    Coronary artery disease due to lipid rich plaque    COPD, mild (HCC)    Hyperlipidemia    Hypercalcemia of malignancy  Resolved Problems:    * No resolved hospital problems.

## 2021-11-17 NOTE — PROGRESS NOTES
Patient's BP were elevated this shift. Patient was asymptomatic and complained of a dull pain from his bilateral knee to back. Patient reported having pain each time he moved on the bed. Last BP was 172/86, HR 63. Will continue to monitor.

## 2021-11-18 ENCOUNTER — ANESTHESIA (OUTPATIENT)
Dept: ENDOSCOPY | Age: 76
DRG: 167 | End: 2021-11-18
Payer: MEDICARE

## 2021-11-18 VITALS
DIASTOLIC BLOOD PRESSURE: 84 MMHG | HEART RATE: 69 BPM | TEMPERATURE: 98 F | WEIGHT: 174.16 LBS | HEIGHT: 68 IN | BODY MASS INDEX: 26.4 KG/M2 | RESPIRATION RATE: 18 BRPM | SYSTOLIC BLOOD PRESSURE: 150 MMHG | OXYGEN SATURATION: 91 %

## 2021-11-18 VITALS
RESPIRATION RATE: 1 BRPM | OXYGEN SATURATION: 89 % | SYSTOLIC BLOOD PRESSURE: 79 MMHG | DIASTOLIC BLOOD PRESSURE: 49 MMHG

## 2021-11-18 LAB
ALBUMIN SERPL-MCNC: 3.4 G/DL (ref 3.4–5)
ALP BLD-CCNC: 156 U/L (ref 40–129)
ALT SERPL-CCNC: 25 U/L (ref 10–40)
ANION GAP SERPL CALCULATED.3IONS-SCNC: 11 MMOL/L (ref 3–16)
APPEARANCE BAL (LAVAGE): ABNORMAL
AST SERPL-CCNC: 43 U/L (ref 15–37)
BILIRUB SERPL-MCNC: <0.2 MG/DL (ref 0–1)
BILIRUBIN DIRECT: <0.2 MG/DL (ref 0–0.3)
BILIRUBIN, INDIRECT: ABNORMAL MG/DL (ref 0–1)
BUN BLDV-MCNC: 20 MG/DL (ref 7–20)
CALCIUM SERPL-MCNC: 8.6 MG/DL (ref 8.3–10.6)
CHLORIDE BLD-SCNC: 97 MMOL/L (ref 99–110)
CLOT EVALUATION BAL: ABNORMAL
CO2: 24 MMOL/L (ref 21–32)
COLOR LAVAGE: ABNORMAL
CREAT SERPL-MCNC: 0.8 MG/DL (ref 0.8–1.3)
EOSIN: 1 %
GFR AFRICAN AMERICAN: >60
GFR NON-AFRICAN AMERICAN: >60
GLUCOSE BLD-MCNC: 105 MG/DL (ref 70–99)
GLUCOSE BLD-MCNC: 116 MG/DL (ref 70–99)
GLUCOSE BLD-MCNC: 123 MG/DL (ref 70–99)
GLUCOSE BLD-MCNC: 130 MG/DL (ref 70–99)
LYMPHOCYTES, BAL: 16 % (ref 5–10)
MACROPHAGES, BAL: 2 % (ref 90–95)
MAGNESIUM: 3.1 MG/DL (ref 1.8–2.4)
MONOCYTES, BAL: 11 %
NUMBER OF CELLS COUNTED BAL (LAVAGE): 100
PERFORMED ON: ABNORMAL
POTASSIUM REFLEX MAGNESIUM: 3.9 MMOL/L (ref 3.5–5.1)
RBC, BAL: ABNORMAL /CUMM
SEGMENTED NEUTROPHILS, BAL: 70 % (ref 5–10)
SODIUM BLD-SCNC: 132 MMOL/L (ref 136–145)
TOTAL PROTEIN: 6.2 G/DL (ref 6.4–8.2)
WBC/EPI CELLS BAL: 87 /CUMM

## 2021-11-18 PROCEDURE — 80048 BASIC METABOLIC PNL TOTAL CA: CPT

## 2021-11-18 PROCEDURE — C1725 CATH, TRANSLUMIN NON-LASER: HCPCS | Performed by: INTERNAL MEDICINE

## 2021-11-18 PROCEDURE — 3609011800 HC BRONCHOSCOPY/TRANSBRONCHIAL LUNG BIOPSY: Performed by: INTERNAL MEDICINE

## 2021-11-18 PROCEDURE — 88341 IMHCHEM/IMCYTCHM EA ADD ANTB: CPT

## 2021-11-18 PROCEDURE — 6370000000 HC RX 637 (ALT 250 FOR IP): Performed by: INTERNAL MEDICINE

## 2021-11-18 PROCEDURE — 2580000003 HC RX 258: Performed by: INTERNAL MEDICINE

## 2021-11-18 PROCEDURE — 3700000001 HC ADD 15 MINUTES (ANESTHESIA): Performed by: INTERNAL MEDICINE

## 2021-11-18 PROCEDURE — 3609020000 HC BRONCHOSCOPY W/EBUS FNA: Performed by: INTERNAL MEDICINE

## 2021-11-18 PROCEDURE — 88172 CYTP DX EVAL FNA 1ST EA SITE: CPT

## 2021-11-18 PROCEDURE — 99232 SBSQ HOSP IP/OBS MODERATE 35: CPT | Performed by: INTERNAL MEDICINE

## 2021-11-18 PROCEDURE — 31625 BRONCHOSCOPY W/BIOPSY(S): CPT | Performed by: INTERNAL MEDICINE

## 2021-11-18 PROCEDURE — 36415 COLL VENOUS BLD VENIPUNCTURE: CPT

## 2021-11-18 PROCEDURE — 94761 N-INVAS EAR/PLS OXIMETRY MLT: CPT

## 2021-11-18 PROCEDURE — 88305 TISSUE EXAM BY PATHOLOGIST: CPT

## 2021-11-18 PROCEDURE — 87077 CULTURE AEROBIC IDENTIFY: CPT

## 2021-11-18 PROCEDURE — 99233 SBSQ HOSP IP/OBS HIGH 50: CPT | Performed by: INTERNAL MEDICINE

## 2021-11-18 PROCEDURE — 88112 CYTOPATH CELL ENHANCE TECH: CPT

## 2021-11-18 PROCEDURE — 87015 SPECIMEN INFECT AGNT CONCNTJ: CPT

## 2021-11-18 PROCEDURE — 83735 ASSAY OF MAGNESIUM: CPT

## 2021-11-18 PROCEDURE — 87116 MYCOBACTERIA CULTURE: CPT

## 2021-11-18 PROCEDURE — 31652 BRONCH EBUS SAMPLNG 1/2 NODE: CPT | Performed by: INTERNAL MEDICINE

## 2021-11-18 PROCEDURE — 2500000003 HC RX 250 WO HCPCS: Performed by: INTERNAL MEDICINE

## 2021-11-18 PROCEDURE — 7100000001 HC PACU RECOVERY - ADDTL 15 MIN: Performed by: INTERNAL MEDICINE

## 2021-11-18 PROCEDURE — 87205 SMEAR GRAM STAIN: CPT

## 2021-11-18 PROCEDURE — 0B9J8ZX DRAINAGE OF LEFT LOWER LUNG LOBE, VIA NATURAL OR ARTIFICIAL OPENING ENDOSCOPIC, DIAGNOSTIC: ICD-10-PCS | Performed by: INTERNAL MEDICINE

## 2021-11-18 PROCEDURE — 2720000010 HC SURG SUPPLY STERILE: Performed by: INTERNAL MEDICINE

## 2021-11-18 PROCEDURE — 0BBJ8ZX EXCISION OF LEFT LOWER LUNG LOBE, VIA NATURAL OR ARTIFICIAL OPENING ENDOSCOPIC, DIAGNOSTIC: ICD-10-PCS | Performed by: INTERNAL MEDICINE

## 2021-11-18 PROCEDURE — 87102 FUNGUS ISOLATION CULTURE: CPT

## 2021-11-18 PROCEDURE — 87070 CULTURE OTHR SPECIMN AEROBIC: CPT

## 2021-11-18 PROCEDURE — 88342 IMHCHEM/IMCYTCHM 1ST ANTB: CPT

## 2021-11-18 PROCEDURE — 88173 CYTOPATH EVAL FNA REPORT: CPT

## 2021-11-18 PROCEDURE — 2580000003 HC RX 258: Performed by: STUDENT IN AN ORGANIZED HEALTH CARE EDUCATION/TRAINING PROGRAM

## 2021-11-18 PROCEDURE — 6360000002 HC RX W HCPCS: Performed by: NURSE ANESTHETIST, CERTIFIED REGISTERED

## 2021-11-18 PROCEDURE — 6360000002 HC RX W HCPCS: Performed by: INTERNAL MEDICINE

## 2021-11-18 PROCEDURE — 2500000003 HC RX 250 WO HCPCS: Performed by: NURSE ANESTHETIST, CERTIFIED REGISTERED

## 2021-11-18 PROCEDURE — 88177 CYTP FNA EVAL EA ADDL: CPT

## 2021-11-18 PROCEDURE — 2709999900 HC NON-CHARGEABLE SUPPLY: Performed by: INTERNAL MEDICINE

## 2021-11-18 PROCEDURE — 3609010800 HC BRONCHOSCOPY ALVEOLAR LAVAGE: Performed by: INTERNAL MEDICINE

## 2021-11-18 PROCEDURE — 31624 DX BRONCHOSCOPE/LAVAGE: CPT | Performed by: INTERNAL MEDICINE

## 2021-11-18 PROCEDURE — 80076 HEPATIC FUNCTION PANEL: CPT

## 2021-11-18 PROCEDURE — 94640 AIRWAY INHALATION TREATMENT: CPT

## 2021-11-18 PROCEDURE — 7100000000 HC PACU RECOVERY - FIRST 15 MIN: Performed by: INTERNAL MEDICINE

## 2021-11-18 PROCEDURE — 87206 SMEAR FLUORESCENT/ACID STAI: CPT

## 2021-11-18 PROCEDURE — 3700000000 HC ANESTHESIA ATTENDED CARE: Performed by: INTERNAL MEDICINE

## 2021-11-18 PROCEDURE — 89051 BODY FLUID CELL COUNT: CPT

## 2021-11-18 RX ORDER — LABETALOL HYDROCHLORIDE 5 MG/ML
5 INJECTION, SOLUTION INTRAVENOUS EVERY 10 MIN PRN
Status: DISCONTINUED | OUTPATIENT
Start: 2021-11-18 | End: 2021-11-18 | Stop reason: HOSPADM

## 2021-11-18 RX ORDER — OXYCODONE HYDROCHLORIDE 5 MG/1
5 TABLET ORAL EVERY 4 HOURS PRN
Qty: 42 TABLET | Refills: 0 | Status: SHIPPED | OUTPATIENT
Start: 2021-11-18 | End: 2021-11-25

## 2021-11-18 RX ORDER — FENTANYL CITRATE 50 UG/ML
50 INJECTION, SOLUTION INTRAMUSCULAR; INTRAVENOUS EVERY 5 MIN PRN
Status: DISCONTINUED | OUTPATIENT
Start: 2021-11-18 | End: 2021-11-18 | Stop reason: HOSPADM

## 2021-11-18 RX ORDER — SODIUM PHOSPHATE, DIBASIC AND SODIUM PHOSPHATE, MONOBASIC 7; 19 G/133ML; G/133ML
1 ENEMA RECTAL
Status: COMPLETED | OUTPATIENT
Start: 2021-11-18 | End: 2021-11-18

## 2021-11-18 RX ORDER — PROPOFOL 10 MG/ML
INJECTION, EMULSION INTRAVENOUS CONTINUOUS PRN
Status: DISCONTINUED | OUTPATIENT
Start: 2021-11-18 | End: 2021-11-18

## 2021-11-18 RX ORDER — ONDANSETRON 2 MG/ML
INJECTION INTRAMUSCULAR; INTRAVENOUS PRN
Status: DISCONTINUED | OUTPATIENT
Start: 2021-11-18 | End: 2021-11-18 | Stop reason: SDUPTHER

## 2021-11-18 RX ORDER — POLYETHYLENE GLYCOL 3350 17 G/17G
17 POWDER, FOR SOLUTION ORAL DAILY PRN
Status: DISCONTINUED | OUTPATIENT
Start: 2021-11-18 | End: 2021-11-18 | Stop reason: HOSPADM

## 2021-11-18 RX ORDER — ONDANSETRON 2 MG/ML
4 INJECTION INTRAMUSCULAR; INTRAVENOUS
Status: DISCONTINUED | OUTPATIENT
Start: 2021-11-18 | End: 2021-11-18 | Stop reason: HOSPADM

## 2021-11-18 RX ORDER — LOSARTAN POTASSIUM 50 MG/1
50 TABLET ORAL DAILY
Qty: 30 TABLET | Refills: 3 | Status: SHIPPED | OUTPATIENT
Start: 2021-11-19 | End: 2021-12-10

## 2021-11-18 RX ORDER — GLYCOPYRROLATE 0.2 MG/ML
INJECTION INTRAMUSCULAR; INTRAVENOUS PRN
Status: DISCONTINUED | OUTPATIENT
Start: 2021-11-18 | End: 2021-11-18 | Stop reason: SDUPTHER

## 2021-11-18 RX ORDER — SODIUM CHLORIDE 0.9 % (FLUSH) 0.9 %
5-40 SYRINGE (ML) INJECTION PRN
Status: DISCONTINUED | OUTPATIENT
Start: 2021-11-18 | End: 2021-11-18

## 2021-11-18 RX ORDER — FENTANYL CITRATE 50 UG/ML
25 INJECTION, SOLUTION INTRAMUSCULAR; INTRAVENOUS EVERY 5 MIN PRN
Status: DISCONTINUED | OUTPATIENT
Start: 2021-11-18 | End: 2021-11-18 | Stop reason: HOSPADM

## 2021-11-18 RX ORDER — SODIUM CHLORIDE 0.9 % (FLUSH) 0.9 %
5-40 SYRINGE (ML) INJECTION EVERY 12 HOURS SCHEDULED
Status: DISCONTINUED | OUTPATIENT
Start: 2021-11-18 | End: 2021-11-18 | Stop reason: HOSPADM

## 2021-11-18 RX ORDER — LIDOCAINE HYDROCHLORIDE 40 MG/ML
INJECTION, SOLUTION RETROBULBAR; TOPICAL PRN
Status: DISCONTINUED | OUTPATIENT
Start: 2021-11-18 | End: 2021-11-18 | Stop reason: ALTCHOICE

## 2021-11-18 RX ORDER — SODIUM CHLORIDE 9 MG/ML
25 INJECTION, SOLUTION INTRAVENOUS PRN
Status: DISCONTINUED | OUTPATIENT
Start: 2021-11-18 | End: 2021-11-18

## 2021-11-18 RX ORDER — ACETAMINOPHEN 650 MG/1
650 SUPPOSITORY RECTAL EVERY 6 HOURS PRN
Status: DISCONTINUED | OUTPATIENT
Start: 2021-11-18 | End: 2021-11-18

## 2021-11-18 RX ORDER — ROCURONIUM BROMIDE 10 MG/ML
INJECTION, SOLUTION INTRAVENOUS PRN
Status: DISCONTINUED | OUTPATIENT
Start: 2021-11-18 | End: 2021-11-18 | Stop reason: SDUPTHER

## 2021-11-18 RX ORDER — SODIUM CHLORIDE 9 MG/ML
25 INJECTION, SOLUTION INTRAVENOUS PRN
Status: DISCONTINUED | OUTPATIENT
Start: 2021-11-18 | End: 2021-11-18 | Stop reason: HOSPADM

## 2021-11-18 RX ORDER — PROPOFOL 10 MG/ML
INJECTION, EMULSION INTRAVENOUS PRN
Status: DISCONTINUED | OUTPATIENT
Start: 2021-11-18 | End: 2021-11-18 | Stop reason: SDUPTHER

## 2021-11-18 RX ORDER — DEXAMETHASONE SODIUM PHOSPHATE 4 MG/ML
INJECTION, SOLUTION INTRA-ARTICULAR; INTRALESIONAL; INTRAMUSCULAR; INTRAVENOUS; SOFT TISSUE PRN
Status: DISCONTINUED | OUTPATIENT
Start: 2021-11-18 | End: 2021-11-18 | Stop reason: SDUPTHER

## 2021-11-18 RX ORDER — HYDRALAZINE HYDROCHLORIDE 20 MG/ML
5 INJECTION INTRAMUSCULAR; INTRAVENOUS EVERY 10 MIN PRN
Status: DISCONTINUED | OUTPATIENT
Start: 2021-11-18 | End: 2021-11-18 | Stop reason: HOSPADM

## 2021-11-18 RX ORDER — SENNA PLUS 8.6 MG/1
1 TABLET ORAL 2 TIMES DAILY
Qty: 60 TABLET | Refills: 0 | COMMUNITY
Start: 2021-11-18 | End: 2021-12-18

## 2021-11-18 RX ORDER — SENNA PLUS 8.6 MG/1
1 TABLET ORAL 2 TIMES DAILY
Status: DISCONTINUED | OUTPATIENT
Start: 2021-11-18 | End: 2021-11-18 | Stop reason: HOSPADM

## 2021-11-18 RX ORDER — FENTANYL CITRATE 50 UG/ML
INJECTION, SOLUTION INTRAMUSCULAR; INTRAVENOUS PRN
Status: DISCONTINUED | OUTPATIENT
Start: 2021-11-18 | End: 2021-11-18 | Stop reason: SDUPTHER

## 2021-11-18 RX ORDER — SODIUM CHLORIDE 0.9 % (FLUSH) 0.9 %
5-40 SYRINGE (ML) INJECTION PRN
Status: DISCONTINUED | OUTPATIENT
Start: 2021-11-18 | End: 2021-11-18 | Stop reason: HOSPADM

## 2021-11-18 RX ORDER — ONDANSETRON 4 MG/1
4 TABLET, ORALLY DISINTEGRATING ORAL EVERY 8 HOURS PRN
Status: DISCONTINUED | OUTPATIENT
Start: 2021-11-18 | End: 2021-11-18 | Stop reason: HOSPADM

## 2021-11-18 RX ORDER — ACETAMINOPHEN 325 MG/1
650 TABLET ORAL EVERY 6 HOURS PRN
Status: DISCONTINUED | OUTPATIENT
Start: 2021-11-18 | End: 2021-11-18

## 2021-11-18 RX ORDER — LIDOCAINE HYDROCHLORIDE 20 MG/ML
INJECTION, SOLUTION EPIDURAL; INFILTRATION; INTRACAUDAL; PERINEURAL PRN
Status: DISCONTINUED | OUTPATIENT
Start: 2021-11-18 | End: 2021-11-18 | Stop reason: SDUPTHER

## 2021-11-18 RX ORDER — ONDANSETRON 2 MG/ML
4 INJECTION INTRAMUSCULAR; INTRAVENOUS EVERY 6 HOURS PRN
Status: DISCONTINUED | OUTPATIENT
Start: 2021-11-18 | End: 2021-11-18 | Stop reason: HOSPADM

## 2021-11-18 RX ORDER — PHENYLEPHRINE HCL IN 0.9% NACL 1 MG/10 ML
SYRINGE (ML) INTRAVENOUS PRN
Status: DISCONTINUED | OUTPATIENT
Start: 2021-11-18 | End: 2021-11-18 | Stop reason: SDUPTHER

## 2021-11-18 RX ORDER — PROPOFOL 10 MG/ML
INJECTION, EMULSION INTRAVENOUS CONTINUOUS PRN
Status: DISCONTINUED | OUTPATIENT
Start: 2021-11-18 | End: 2021-11-18 | Stop reason: SDUPTHER

## 2021-11-18 RX ORDER — PROCHLORPERAZINE EDISYLATE 5 MG/ML
5 INJECTION INTRAMUSCULAR; INTRAVENOUS
Status: DISCONTINUED | OUTPATIENT
Start: 2021-11-18 | End: 2021-11-18 | Stop reason: HOSPADM

## 2021-11-18 RX ORDER — SODIUM CHLORIDE 9 MG/ML
INJECTION, SOLUTION INTRAVENOUS CONTINUOUS
Status: DISCONTINUED | OUTPATIENT
Start: 2021-11-18 | End: 2021-11-18

## 2021-11-18 RX ADMIN — PROPOFOL 180 MG: 10 INJECTION, EMULSION INTRAVENOUS at 07:42

## 2021-11-18 RX ADMIN — DEXAMETHASONE SODIUM PHOSPHATE 6 MG: 10 INJECTION INTRAMUSCULAR; INTRAVENOUS at 10:19

## 2021-11-18 RX ADMIN — ENOXAPARIN SODIUM 40 MG: 100 INJECTION SUBCUTANEOUS at 10:19

## 2021-11-18 RX ADMIN — Medication 10 ML: at 10:19

## 2021-11-18 RX ADMIN — LIDOCAINE HYDROCHLORIDE 80 MG: 20 INJECTION, SOLUTION EPIDURAL; INFILTRATION; INTRACAUDAL; PERINEURAL at 07:42

## 2021-11-18 RX ADMIN — SODIUM CHLORIDE: 9 INJECTION, SOLUTION INTRAVENOUS at 06:24

## 2021-11-18 RX ADMIN — LOSARTAN POTASSIUM 50 MG: 50 TABLET, FILM COATED ORAL at 10:19

## 2021-11-18 RX ADMIN — SODIUM PHOSPHATE, DIBASIC AND SODIUM PHOSPHATE, MONOBASIC 1 ENEMA: 7; 19 ENEMA RECTAL at 12:23

## 2021-11-18 RX ADMIN — FENTANYL CITRATE 50 MCG: 50 INJECTION INTRAMUSCULAR; INTRAVENOUS at 07:37

## 2021-11-18 RX ADMIN — ONDANSETRON 4 MG: 2 INJECTION INTRAMUSCULAR; INTRAVENOUS at 07:54

## 2021-11-18 RX ADMIN — BUDESONIDE AND FORMOTEROL FUMARATE DIHYDRATE 1 PUFF: 160; 4.5 AEROSOL RESPIRATORY (INHALATION) at 09:55

## 2021-11-18 RX ADMIN — Medication 50 MCG: at 07:56

## 2021-11-18 RX ADMIN — GLYCOPYRROLATE 0.2 MG: 0.2 INJECTION, SOLUTION INTRAMUSCULAR; INTRAVENOUS at 07:36

## 2021-11-18 RX ADMIN — SODIUM CHLORIDE, PRESERVATIVE FREE 10 ML: 5 INJECTION INTRAVENOUS at 12:39

## 2021-11-18 RX ADMIN — SENNOSIDES 8.6 MG: 8.6 TABLET, COATED ORAL at 10:19

## 2021-11-18 RX ADMIN — ATORVASTATIN CALCIUM 40 MG: 40 TABLET, FILM COATED ORAL at 10:19

## 2021-11-18 RX ADMIN — TIOTROPIUM BROMIDE INHALATION SPRAY 2 PUFF: 3.12 SPRAY, METERED RESPIRATORY (INHALATION) at 09:57

## 2021-11-18 RX ADMIN — Medication 50 MCG: at 08:01

## 2021-11-18 RX ADMIN — PROPOFOL 180 MCG/KG/MIN: 10 INJECTION, EMULSION INTRAVENOUS at 07:46

## 2021-11-18 RX ADMIN — Medication 100 MCG: at 08:07

## 2021-11-18 RX ADMIN — SUGAMMADEX 200 MG: 100 INJECTION, SOLUTION INTRAVENOUS at 08:15

## 2021-11-18 RX ADMIN — METOPROLOL SUCCINATE 50 MG: 50 TABLET, EXTENDED RELEASE ORAL at 10:19

## 2021-11-18 RX ADMIN — DEXAMETHASONE SODIUM PHOSPHATE 4 MG: 4 INJECTION, SOLUTION INTRAMUSCULAR; INTRAVENOUS at 07:54

## 2021-11-18 RX ADMIN — ROCURONIUM BROMIDE 20 MG: 10 INJECTION INTRAVENOUS at 07:48

## 2021-11-18 ASSESSMENT — PULMONARY FUNCTION TESTS
PIF_VALUE: 12
PIF_VALUE: 0
PIF_VALUE: 1
PIF_VALUE: 12
PIF_VALUE: 12
PIF_VALUE: 1
PIF_VALUE: 8
PIF_VALUE: 7
PIF_VALUE: 7
PIF_VALUE: 0
PIF_VALUE: 7
PIF_VALUE: 1
PIF_VALUE: 1
PIF_VALUE: 9
PIF_VALUE: 14
PIF_VALUE: 3
PIF_VALUE: 0
PIF_VALUE: 22
PIF_VALUE: 3
PIF_VALUE: 1
PIF_VALUE: 7
PIF_VALUE: 7
PIF_VALUE: 9
PIF_VALUE: 12
PIF_VALUE: 1
PIF_VALUE: 12
PIF_VALUE: 7
PIF_VALUE: 12
PIF_VALUE: 12
PIF_VALUE: 7
PIF_VALUE: 12
PIF_VALUE: 4
PIF_VALUE: 7
PIF_VALUE: 9
PIF_VALUE: 0
PIF_VALUE: 1
PIF_VALUE: 7
PIF_VALUE: 23
PIF_VALUE: 8
PIF_VALUE: 3
PIF_VALUE: 7
PIF_VALUE: 1

## 2021-11-18 ASSESSMENT — PAIN SCALES - GENERAL
PAINLEVEL_OUTOF10: 0

## 2021-11-18 ASSESSMENT — LIFESTYLE VARIABLES: SMOKING_STATUS: 0

## 2021-11-18 NOTE — PLAN OF CARE
Problem: Falls - Risk of:  Goal: Will remain free from falls  Description: Will remain free from falls  11/18/2021 1151 by Anastacio Barton RN  Outcome: Ongoing  11/18/2021 0229 by Darlene Craig RN  Outcome: Ongoing  Goal: Absence of physical injury  Description: Absence of physical injury  11/18/2021 1151 by Anastacio Barton RN  Outcome: Ongoing  11/18/2021 0229 by Darlene Craig RN  Outcome: Ongoing     Problem: Nutrition  Goal: Optimal nutrition therapy  11/18/2021 1151 by Anastacio Barton RN  Outcome: Ongoing  11/18/2021 0229 by Darlene Craig RN  Outcome: Ongoing     Problem: Infection:  Goal: Will remain free from infection  Description: Will remain free from infection  11/18/2021 1151 by Anastacio Barton RN  Outcome: Ongoing  11/18/2021 0229 by Darlene Craig RN  Outcome: Ongoing     Problem: Safety:  Goal: Free from accidental physical injury  Description: Free from accidental physical injury  11/18/2021 1151 by Anastacio Barton RN  Outcome: Ongoing  11/18/2021 0229 by Darlene Craig RN  Outcome: Ongoing  Goal: Free from intentional harm  Description: Free from intentional harm  11/18/2021 1151 by Anastacio Barton RN  Outcome: Ongoing  11/18/2021 0229 by Darlene Craig RN  Outcome: Ongoing     Problem: Daily Care:  Goal: Daily care needs are met  Description: Daily care needs are met  11/18/2021 1151 by Anastacio Barton RN  Outcome: Ongoing  11/18/2021 0229 by Darlene Craig RN  Outcome: Ongoing     Problem: Pain:  Goal: Patient's pain/discomfort is manageable  Description: Patient's pain/discomfort is manageable  11/18/2021 1151 by Anastacio Barton RN  Outcome: Ongoing  11/18/2021 0229 by Darlene Craig RN  Outcome: Ongoing  Goal: Pain level will decrease  Description: Pain level will decrease  Outcome: Ongoing  Goal: Control of acute pain  Description: Control of acute pain  Outcome: Ongoing  Goal: Control of chronic pain  Description: Control of chronic pain  Outcome: Ongoing     Problem: Skin Integrity:  Goal: Skin integrity will stabilize  Description: Skin integrity will stabilize  11/18/2021 1151 by Jaylin Soliman RN  Outcome: Ongoing  11/18/2021 0229 by Lizzie Snowden RN  Outcome: Ongoing     Problem: Discharge Planning:  Goal: Patients continuum of care needs are met  Description: Patients continuum of care needs are met  11/18/2021 1151 by Jaylin Soliman RN  Outcome: Ongoing  11/18/2021 0229 by Lizzie Snowden RN  Outcome: Ongoing

## 2021-11-18 NOTE — PROGRESS NOTES
Patient calcium remains normal.  Kidney function stable. Patient has had a bowel movement. Okay to discharge to home today.   Electronically signed by Chance Beavers MD on 11/18/2021 at 12:53 PM

## 2021-11-18 NOTE — PROGRESS NOTES
Oncology Note    Patient down a bronchoscopy. His calcium has normalized. He has an appointment with me Monday. It is okay to discharge from my standpoint.     Veronica Romero MD

## 2021-11-18 NOTE — H&P
Fiberoptic bronchoscopy history:     Nursing History and Physical reviewed and agreed upon: For additional findings, please see my notes in EPIC. Lung mass with mediastinal adenopathy. Patient has No Known Allergies. Past Medical History:   Diagnosis Date    C. difficile colitis 02/2017    cefuroxime    CAD (coronary artery disease) 2002    Stent to LAD Cynthia Jha)    COPD (chronic obstructive pulmonary disease) ([address]     GERD (gastroesophageal reflux disease)     Hyperlipidemia     Hypertension     Venous thrombosis of leg 03/2017    Occurred after a plane flight to [place]- Left saphenous vein extending to the saphenofemoral junction       Past Surgical History:   Procedure Laterality Date    CARDIAC CATHETERIZATION      NL 2/2007    CATARACT REMOVAL Left 2014    CATARACT REMOVAL Right 05/2017    COLONOSCOPY  08/2005    NL (Dr. Concepcion Landis)    COLONOSCOPY  07/08/2016    Stotz- polyps, repeat 3 years    COLONOSCOPY  01/29/2020    Stotz-normal colon, repeat in 5 years    COLONOSCOPY  01/29/2020    Stotz-normal, repeat in 5 years    CORONARY ANGIOPLASTY WITH STENT PLACEMENT      LAD-12/2002-Dr. Deanne Carmona HAND SURGERY  1970's    SHOULDER SURGERY      RT Shoulder 1998, [address]  RT-1997       No Known Allergies    Medication list was reviewed and updated as needed in Epic     reports that he quit smoking about 30 years ago. He started smoking about 42 years ago. He has a 2.50 pack-year smoking history. He has never used smokeless tobacco.    family history includes Heart Disease in his father; Lupus in his daughter; Other in his child; Parkinsonism in his mother. HENT: Airway patent and reviewed. Mallampati III  Cardiovascular: Normal rate, regular rhythm, normal heart sounds. Pulmonary/Chest: No wheezes. No rhonchi. No rales. Abdominal: Soft. Bowel sounds are normal. No distension. ASA CLASS    III.  Severe Systemic Disease      Sedation plan:   Per anesthesia      Post Procedure Plan   Return to same level of care   ______________________     The risks and benefits as well as alternatives to the procedure have been discussed with the patient. The patient understands and agrees to proceed.     Corrine Guerrero MD

## 2021-11-18 NOTE — PLAN OF CARE
Problem: Falls - Risk of:  Goal: Will remain free from falls  Description: Will remain free from falls  11/18/2021 0229 by Linette Theodore RN  Outcome: Ongoing  11/17/2021 1442 by Prince Ghotra RN  Outcome: Ongoing     Problem: Falls - Risk of:  Goal: Absence of physical injury  Description: Absence of physical injury  11/18/2021 0229 by Linette Theodore RN  Outcome: Ongoing  11/17/2021 1442 by Prince Ghotra RN  Outcome: Ongoing   Patient uses call light appropriately to express needs. Bed to lowest position with door open and call light in reach. All fall precautions implemented at this time. Bed alarm on for safety. Siderails up x2. Non skid footwear in place. Patient has had no falls this shift. Will continue to monitor.    Problem: Nutrition  Goal: Optimal nutrition therapy  11/18/2021 0229 by Linette Theodore RN  Outcome: Ongoing  11/17/2021 1442 by Prince Ghotra RN  Outcome: Ongoing     Problem: Infection:  Goal: Will remain free from infection  Description: Will remain free from infection  11/18/2021 0229 by Linette Theodore RN  Outcome: Ongoing  11/17/2021 1442 by Prince Ghotra RN  Outcome: Ongoing     Problem: Safety:  Goal: Free from accidental physical injury  Description: Free from accidental physical injury  11/18/2021 0229 by Linette Theodore RN  Outcome: Ongoing  11/17/2021 1442 by Prince Ghotra RN  Outcome: Ongoing     Problem: Safety:  Goal: Free from intentional harm  Description: Free from intentional harm  11/18/2021 0229 by Linette Theodore RN  Outcome: Ongoing  11/17/2021 1442 by Prince Ghotra RN  Outcome: Ongoing     Problem: Safety:  Goal: Free from intentional harm  Description: Free from intentional harm  11/18/2021 0229 by Linette Theodore RN  Outcome: Ongoing  11/17/2021 1442 by Prince Ghotra RN  Outcome: Ongoing     Problem: Daily Care:  Goal: Daily care needs are met  Description: Daily care needs are met  11/18/2021 0229 by Linette Theodore RN  Outcome: Ongoing  11/17/2021 1442 by Ross Ladd RN  Outcome: Ongoing     Problem: Pain:  Goal: Patient's pain/discomfort is manageable  Description: Patient's pain/discomfort is manageable  11/18/2021 0229 by Trinidad Koch RN  Outcome: Ongoing  11/17/2021 1442 by Ross Ladd RN  Outcome: Ongoing   Pain/discomfort being managed with PRN analgesics per MD orders. Pt able to express presence and absence of pain and rate pain appropriately using numerical scale.     Problem: Skin Integrity:  Goal: Skin integrity will stabilize  Description: Skin integrity will stabilize  11/18/2021 0229 by Trinidad Koch RN  Outcome: Ongoing  11/17/2021 1442 by Ross Ladd RN  Outcome: Ongoing     Problem: Discharge Planning:  Goal: Patients continuum of care needs are met  Description: Patients continuum of care needs are met  11/18/2021 0229 by Trinidad Koch RN  Outcome: Ongoing  11/17/2021 1442 by Ross Ladd RN  Outcome: Ongoing

## 2021-11-18 NOTE — CARE COORDINATION
CASE MANAGEMENT DISCHARGE SUMMARY:    DISCHARGE DATE: 11/18/21    DISCHARGED TO HOME     TRANSPORTATION: wife             TIME: at bedside               Electronically signed by Anthony Islas RN on 11/18/2021 at 1:26 PM

## 2021-11-18 NOTE — FLOWSHEET NOTE
Up to void and in bed for surgery. IVF's started. Wife at bedside. C/O pain \"all over\" but refusing medication at this time. Instructed not to let pain get out of control, verbalized understanding. Also frustrated about constipation. MOM given at HS with no results. Will discuss with MD today. Ready for bronch. Denies any other needs at this time.

## 2021-11-18 NOTE — DISCHARGE INSTR - DIET

## 2021-11-18 NOTE — PROGRESS NOTES
Written and verbal DC instruction reviewed with pt. Pt states understanding, all questions answered. IV removed without complications. Dressing clean dry and intact. Pt awaiting transportation to vehicle.

## 2021-11-18 NOTE — PROGRESS NOTES
[facility] Internal Medicine Note      Chief Complaint: I feel ok    Subjective/Interval History:    Patient was seen this morning in postop after bronchoscopy. He was doing well. He had no new complaints and was feeling like he wanted to go home today. He still has not had a bowel movement. No other new concerns. Bronch done today with preliminary results being small cell lung cancer. Case discussed with wife and daughter at length. No chest pain or shortness breath. No cough or sputum. No nausea, vomiting, diarrhea. No abdominal pain. No dysuria. The remainder of the review of systems is negative. PMH, PSH, FH/SH reviewed and unchanged as documented in the H&P personally documented at admission 21    Medication list reviewed    Objective:    /71   Pulse 62   Temp 97.6 °F (36.4 °C) (Temporal)   Resp 15   Ht 5' 8\" (1.727 m)   Wt 174 lb 2.6 oz (79 kg)   SpO2 92%   BMI 26.48 kg/m²   Temp  Av.9 °F (36.6 °C)  Min: 97.2 °F (36.2 °C)  Max: 98.4 °F (36.9 °C)    RRR. Telemetry with sinus rhythm. Chest-respirations are easy. Ext- no edema    The Following Labs Were Reviewed Today:    Recent Results (from the past 24 hour(s))   POCT Glucose    Collection Time: 21 11:24 AM   Result Value Ref Range    POC Glucose 177 (H) 70 - 99 mg/dl    Performed on ACCU-CHEK    POCT Glucose    Collection Time: 21  4:24 PM   Result Value Ref Range    POC Glucose 151 (H) 70 - 99 mg/dl    Performed on ACCU-CHEK    POCT Glucose    Collection Time: 21  8:07 PM   Result Value Ref Range    POC Glucose 125 (H) 70 - 99 mg/dl    Performed on ACCU-CHEK    POCT Glucose    Collection Time: 21  6:31 AM   Result Value Ref Range    POC Glucose 123 (H) 70 - 99 mg/dl    Performed on ACCU-CHEK        ASSESSMENT/PLAN:      Principal Problem: Mass of left lung-preliminary path shows small cell lung cancer.   Await final and the patient has follow-up with Dr. Alana Riedel scheduled for Monday. Active Problems:    Hypercalcemia of malignancy-labs pending for today. Calcium had normalized yesterday. Continue to monitor. PTH was low as expected. Benalton Camarena says that steroids would not be needed at discharge. Continue to monitor. Hyperglycemia-blood sugars have been okay. Primary hypertension-losartan was started yesterday. Will monitor as an outpatient. Blood pressure was low this morning during procedure. Coronary artery disease due to lipid rich plaque-asymptomatic. Continue statin therapy and beta-blocker. Plan to restart aspirin at discharge. COPD, mild (HCC)-continue inhaler regimen. Hyperlipidemia-stable on current statin dosing. Disposition-possible discharge today if patient is doing well, calcium stable and bowels have moved. Time > 35 minutes reviewing chart and patient data, examining and interviewing patient, and discussing with nursing staff, and family at the bedside.        Leena Hernández MD, [address]  9:46 AM  11/18/2021

## 2021-11-18 NOTE — PROGRESS NOTES
Admitted to PACU 13 from ENDO, pt sedate with oral airway which was removed by SRNA @ bedside & placed on 4L O2 NC. Pt was drowsy but arousable, VSS on r 4L O2 NC. Report recd from anesthesia.

## 2021-11-18 NOTE — PROGRESS NOTES
Endoscopic Procedure Performed:    ENDOSCOPIC BRONCHIAL ULTRASOUND WITH: Fine Needle Aspiration of    Lymph Node Station(s):     []  Not Applicable  [] 1 (Supraclavicular zone nodes)  [] 2R (Right Upper Paratracheal)   [] 2L (Left Upper Paratracheal)  [] 3 (Prevascular and Prevertabral nodes)  [] 4R (Right Lower Paratracheal)     [x] 4L (Left Lower Paratracheal) 4 Passes   [] 5 (Subaortic)   [] 6 (Para-aortic nodes)  []  7 (Subcarinal)  []  8 (Paraesophageal)  []  9 (Pulmonary ligament)   []  10 (Hilar)    []  11 L (Interlobar)              []  11 R (Interlobar)       All FNA specimens managed by cytotechnician.        Electronically signed by Scott Morrissey RN on 11/18/2021 at 8:53 AM

## 2021-11-18 NOTE — PROGRESS NOTES
Medications administered and monitored by CRNA, see anesthesia record.     Electronically signed by Jerardo Patel RN on 11/18/2021 at 7:45 AM

## 2021-11-18 NOTE — ANESTHESIA PRE PROCEDURE
IntraVENous Q6H PRN Magdalena Krause MD        polyethylene glycol [facility]) packet 17 g  17 g Oral Daily PRN Magdalena Krause MD        sodium chloride flush 0.9 % injection 5-40 mL  5-40 mL IntraVENous 2 times per day Magdalena Krause MD        acetaminophen (TYLENOL) tablet 1,000 mg  1,000 mg Oral Q6H PRN Gus Ball MD   1,000 mg at 11/17/21 2113    oxyCODONE (ROXICODONE) immediate release tablet 5 mg  5 mg Oral Q3H PRN Keysha March MD   5 mg at 11/17/21 1823    losartan (COZAAR) tablet 50 mg  50 mg Oral Daily Magdalena Krause MD   50 mg at 11/17/21 1221    dexamethasone (DECADRON) injection 6 mg  6 mg IntraVENous Q12H Keysha March MD   6 mg at 11/17/21 2113    pantoprazole (PROTONIX) tablet 40 mg  40 mg Oral QAM AC Keysha March MD        atorvastatin (LIPITOR) tablet 40 mg  40 mg Oral Daily Magdalena Krause MD   40 mg at 11/17/21 0935    metoprolol succinate (TOPROL XL) extended release tablet 50 mg  50 mg Oral Daily Magdalena Krause MD   50 mg at 11/17/21 0935    glucose (GLUTOSE) 40 % oral gel 15 g  15 g Oral PRN Magdalena Krause MD        dextrose 50 % IV solution  12.5 g IntraVENous PRN Magdalena Krause MD        glucagon (rDNA) injection 1 mg  1 mg IntraMUSCular PRN Magdalena Krause MD        dextrose 5 % solution  100 mL/hr IntraVENous PRN Magdalena Krause MD        budesonide-formoterol [facility]) 160-4.5 MCG/ACT inhaler 1 puff  1 puff Inhalation BID Magdalena Krause MD   1 puff at 11/17/21 2102    tiotropium (SPIRIVA RESPIMAT) 2.5 MCG/ACT inhaler 2 puff  2 puff Inhalation Daily Magdalena Krause MD   2 puff at 11/17/21 0951    magnesium hydroxide (MILK OF MAGNESIA) 400 MG/5ML suspension 30 mL  30 mL Oral Daily PRN Gus Ball MD   30 mL at 11/17/21 2113    insulin lispro (HUMALOG) injection vial 0-12 Units  0-12 Units SubCUTAneous TID WC Magdalena Krause MD   2 Units at 21 1715    insulin lispro (HUMALOG) injection vial 0-6 Units  0-6 Units SubCUTAneous Nightly Shavon Yousif MD           Allergies:  No Known Allergies    Problem List:    Patient Active Problem List   Diagnosis Code    Hyperglycemia R73.9    Primary hypertension I10    Coronary artery disease due to lipid rich plaque I25.10, I25.83    COPD, mild (HCC) J44.9    Hyperlipidemia E78.5    GERD (gastroesophageal reflux disease) K21.9    Otalgia H92.09    Cerumen impaction H61.20    Mass of left lung R91.8    Hypercalcemia of malignancy E83.52       Past Medical History:        Diagnosis Date    C. difficile colitis 2017    cefuroxime    CAD (coronary artery disease)     Stent to LAD Shelley Emerson)    COPD (chronic obstructive pulmonary disease) ([facility] [address]     GERD (gastroesophageal reflux disease)     Hyperlipidemia     Hypertension     Venous thrombosis of leg 2017    Occurred after a plane flight to [place]- Left saphenous vein extending to the saphenofemoral junction       Past Surgical History:        Procedure Laterality Date    CARDIAC CATHETERIZATION      NL 2007    CATARACT REMOVAL Left     CATARACT REMOVAL Right 2017    COLONOSCOPY  2005    NL (Dr. Carly Solorzano)    COLONOSCOPY  2016    Stotz- polyps, repeat 3 years    COLONOSCOPY  2020    Stotz-normal colon, repeat in 5 years    COLONOSCOPY  2020    Stotz-normal, repeat in 5 years    CORONARY ANGIOPLASTY WITH STENT PLACEMENT      LAD-2002-Dr. Gissell Curry HAND SURGERY  's   [place]      RT Shoulder , [facility] ARTHROPLASTY  RT-       Social History:    Social History     Tobacco Use    Smoking status: Former Smoker     Packs/day: 0.25     Years: 10.00     Pack years: 2.50     Start date: 7/10/1979     Quit date: 1991     Years since quittin.9    Smokeless tobacco: Never Used   Substance Use Topics    Alcohol use: Yes     Comment: 1-2 radha daily                                 Counseling given: Not Answered      Vital Signs (Current):   Vitals:    11/17/21 2006 11/17/21 2104 11/18/21 0527 11/18/21 0651   BP: (!) 170/86  (!) 177/99 (!) 185/95   Pulse: 64  67 66   Resp: 18 18 18    Temp: 98 °F (36.7 °C)  98.2 °F (36.8 °C) 98.4 °F (36.9 °C)   TempSrc: Oral  Oral Temporal   SpO2: 92% 93% 92% 94%   Weight:   174 lb 2.6 oz (79 kg)    Height:                                                  BP Readings from Last 3 Encounters:   11/18/21 (!) 185/95   11/15/21 (!) 150/88   11/05/21 110/62       NPO Status: Time of last liquid consumption: 2355                        Time of last solid consumption: 1200                        Date of last liquid consumption: 11/17/21                        Date of last solid food consumption: 11/17/21    BMI:   Wt Readings from Last 3 Encounters:   11/18/21 174 lb 2.6 oz (79 kg)   11/05/21 175 lb 3.2 oz (79.5 kg)   10/29/21 181 lb 12.8 oz (82.5 kg)     Body mass index is 26.48 kg/m².     CBC:   Lab Results   Component Value Date    WBC 12.0 11/17/2021    RBC 4.35 11/17/2021    HGB 12.3 11/17/2021    HCT 37.4 11/17/2021    MCV 86.0 11/17/2021    RDW 13.4 11/17/2021     11/17/2021       CMP:   Lab Results   Component Value Date     11/17/2021    K 4.2 11/17/2021    K 4.4 11/16/2021     11/17/2021    CO2 24 11/17/2021    BUN 28 11/17/2021    CREATININE 0.9 11/17/2021    GFRAA >60 11/17/2021    GFRAA >60 10/22/2012    AGRATIO 1.3 11/16/2021    LABGLOM >60 11/17/2021    GLUCOSE 131 11/17/2021    PROT 6.2 11/17/2021    PROT 6.7 10/22/2012    CALCIUM 10.4 11/17/2021    BILITOT 0.3 11/17/2021    ALKPHOS 107 11/17/2021    AST 26 11/17/2021    ALT 20 11/17/2021       POC Tests:   Recent Labs     11/18/21  0631   POCGLU 123*       Coags: No results found for: PROTIME, INR, APTT    HCG (If Applicable): No results found for: PREGTESTUR, PREGSERUM, HCG, HCGQUANT     ABGs: No results found for: PHART, PO2ART, APD0GOO, UUF4DUA, BEART, V3KMOPMR     Type & Screen (If Applicable):  No results found for: LABABO, LABRH    Drug/Infectious Status (If Applicable):  No results found for: HIV, HEPCAB    COVID-19 Screening (If Applicable):   Lab Results   Component Value Date    COVID19 Not Detected 11/15/2021    COVID19 DETECTED 08/14/2020           Anesthesia Evaluation   no history of anesthetic complications:   Airway: Mallampati: III  TM distance: >3 FB   Neck ROM: limited  Comment: Likely anterior airway. Glidescope available in endo suite. Mouth opening: < 3 FB Dental:    (+) caps      Pulmonary: breath sounds clear to auscultation  (+) COPD:      (-) asthma, sleep apnea, rhonchi, wheezes, rales and not a current smoker                           Cardiovascular:  Exercise tolerance: good (>4 METS),   (+) hypertension:, CAD:, CABG/stent (PCI to LAD):, hyperlipidemia    (-) orthopnea,  CLARK, weak pulses and peripheral edema      Rhythm: regular  Rate: normal           Beta Blocker:  Dose within 24 Hrs      ROS comment: Echocardiogram:  Summary    Normal left ventricle size, wall thickness and systolic function with an estimated ejection fraction of 50%. No regional wall motion abnormalities are seen. Mildly enlarged right ventricular size and normal function. Tricuspid valve is structurally normal. There is mild tricuspid regurgitation with RVSP estimated at 45 mmHg. This is suggestive of mild pulmonary hypertension.          Neuro/Psych:      (-) seizures, TIA and CVA           GI/Hepatic/Renal:   (+) GERD:,      (-) liver disease, no renal disease and no morbid obesity       Endo/Other:    (+) malignancy/cancer. (-) diabetes mellitus, hypothyroidism, hyperthyroidism               Abdominal:         (-) obese Abdomen: soft. Vascular: Other Findings:           Anesthesia Plan      general     ASA 3     (Nebulizer treatment on the floor, lung fields clear. Reports no bowel movement since admission.  NPO appropriate, denies nausea, passing flatus regularly.)  Induction: intravenous. Anesthetic plan and risks discussed with patient. Use of blood products discussed with patient whom consented to blood products. Plan discussed with CRNA. This pre-anesthesia assessment may be used as a history and physical.    DOS STAFF ADDENDUM:    Pt seen and examined, chart reviewed (including anesthesia, drug and allergy history). No interval changes to history and physical examination. Anesthetic plan, risks, benefits, alternatives, and personnel involved discussed with patient. Patient verbalized an understanding and agrees to proceed.       Jam Cabrera MD  November 18, 2021  7:22 AM

## 2021-11-19 ENCOUNTER — CARE COORDINATION (OUTPATIENT)
Dept: CASE MANAGEMENT | Age: 76
End: 2021-11-19

## 2021-11-19 DIAGNOSIS — E83.52 HYPERCALCEMIA OF MALIGNANCY: Primary | ICD-10-CM

## 2021-11-19 PROCEDURE — 1111F DSCHRG MED/CURRENT MED MERGE: CPT | Performed by: INTERNAL MEDICINE

## 2021-11-19 NOTE — CARE COORDINATION
[facility] Transitions Initial Follow Up Call    Call within 2 business days of discharge: Yes    Patient: Violet Del Real Patient : 1945   MRN: [id number]  Reason for Admission: hypercalcemia, lung mass, COPD  Discharge Date: 21 RARS: Readmission Risk Score: 13.4 ( )      Last Discharge [facility]       Complaint Diagnosis Description Type Department Provider    21 Fatigue; Generalized Body Aches; Other Hypercalcemia . .. ED to Hosp-Admission (Discharged) (ADMITTED) Rashad Camargo MD; Zuleyka Jensen. Spoke with: QUEENIE Mcfarland 267: [facility]    Non-face-to-face services provided:  Obtained and reviewed discharge summary and/or continuity of care documents    Transitions of Care Initial Call    Was this an external facility discharge? No Discharge Facility: NA    Challenges to be reviewed by the provider   Additional needs identified to be addressed with provider: No  none             Method of communication with provider : phone      Advance Care Planning:   Does patient have an Advance Directive: decision maker updated. Advance Care Planning   Healthcare Decision Maker:    Primary Decision Maker: Susan Joel - [phone]    Was this a readmission? No  Patient stated reason for admission: malaise, fatgiue, abnormal labs  Patients top risk factors for readmission: medical condition-hypercalcemia, lung mass, COPD    Care Transition Nurse (CTN) contacted the patient by telephone to perform post hospital discharge assessment. Verified name and  with patient as identifiers. Provided introduction to self, and explanation of the CTN role. CTN reviewed discharge instructions, medical action plan and red flags with patient who verbalized understanding. Patient given an opportunity to ask questions and does not have any further questions or concerns at this time. Were discharge instructions available to patient? Yes.  Reviewed appropriate site of care based on symptoms and resources available to patient including: PCP and Specialist. The patient agrees to contact the PCP office for questions related to their healthcare. Medication reconciliation was performed with patient, who verbalizes understanding of administration of home medications. Advised obtaining a 90-day supply of all daily and as-needed medications. Covid Risk Education     Educated patient about risk for severe COVID-19 due to risk factors according to CDC guidelines. LPN CC reviewed discharge instructions, medical action plan and red flag symptoms with the patient who verbalized understanding. Discussed COVID vaccination status: No. Education provided on COVID-19 vaccination as appropriate. Discussed exposure protocols and quarantine with CDC Guidelines. Patient was given an opportunity to verbalize any questions and concerns and agrees to contact LPN CC or health care provider for questions related to their healthcare. Reviewed and educated patient on any new and changed medications related to discharge diagnosis. Was patient discharged with a pulse oximeter? No Discussed and confirmed pulse oximeter discharge instructions and when to notify provider or seek emergency care. Patient verified  and was pleasant and agreeable to transition calls. States he is weak. Pain comes and goes. Denies fevers/chills, N/V/D. Appetite diminished. LPN CC encouraged supplementation with Ensure. Reports some constipation that is slowly resolving. LPN CC encouraged ambulation, hydration, senna, docusate. Patient verbalized understanding. Complains of bilateral rib pain, states he is taking IBPF. States he will make sure that's ok with PCP. Full medication reconciliation and 1111f completed. Scheduled to see PCP  and go to [facility]  for PET, radiation. Chemo tentatively scheduled for . Patient seems to have good support from family.  Denies any acute needs at present time. Agreeable to f/u calls. Educated on the use of urgent care or physicians 24 hr access line if assistance is needed after hours. LPN CC provided contact information. Plan for follow-up call in 5-7 days based on severity of symptoms and risk factors.   Cortney Hampton LPN [address] [place] [address]  [phone]    Care Transitions 24 Hour Call    Do you have any ongoing symptoms?: No  Do you have a copy of your discharge instructions?: Yes  Do you have all of your prescriptions and are they filled?: Yes  Have you been contacted by a [address]?: No  Have you scheduled your follow up appointment?: Yes  How are you going to get to your appointment?: Car - family or friend to transport  Were you discharged with any Home Care or Post Acute Services: No  Do you feel like you have everything you need to keep you well at home?: Yes  Care Transitions Interventions         Follow Up  Future Appointments   Date Time Provider Emilee Mac   11/23/2021  9:45 AM Mitzi Connor MD [address] [place] [address], N

## 2021-11-20 LAB
BLOOD CULTURE, ROUTINE: NORMAL
CULTURE, BLOOD 2: NORMAL
CULTURE, RESPIRATORY: ABNORMAL
CULTURE, RESPIRATORY: ABNORMAL
GRAM STAIN RESULT: ABNORMAL
ORGANISM: ABNORMAL

## 2021-11-22 LAB — PTH RELATED PEPTIDE: 3.9 PMOL/L (ref 0–2.3)

## 2021-11-29 NOTE — DISCHARGE SUMMARY
[address] [address]                               DISCHARGE SUMMARY    PATIENT NAME: Frances Whitley                   :        1945  MED REC NO:   [id number]                          ROOM:       9325  ACCOUNT NO:   [de-identified]                           ADMIT DATE: 2021  PROVIDER:     Karli Bradley MD                  DISCHARGE DATE:  2021    REASON FOR ADMISSION:  Hypercalcemia. HISTORY OF PRESENT ILLNESS:  This is a 30-year-old white male with a  history of COPD, coronary artery disease, hypertension, and  hyperlipidemia who presented to the outpatient office two weeks prior to  admission with lower back pain and sciatica. The patient was treated  with steroids and muscle relaxants. He had no immediate effects and  continued to overall not feel well. He presented back to the outpatient  office with two weeks of malaise and chills and feeling feverish without  a fever above 99.9. He had some anorexia and constipation and overall  just did not feel well. Labs were sent and the patient was found to  have a calcium of 14.1. He was sent to the emergency room that evening  once the calcium level was reported and his repeat calcium was 14 and  the creatinine was 1.3 which is up from his baseline of 0.7. He was  admitted for treatment of his hypercalcemia and hydration. In addition,  in the emergency room, the patient had a chest x-ray showing hilar  adenopathy on the left side. His CT scan revealed a left lower lobe  mass that extended across the major fissure with evidence of hilar  adenopathy and lytic bone lesions. Pulmonary and Oncology were  consulted and the patient was scheduled for bronchoscopy. HOSPITAL COURSE:  1.  Mass of left lung. Preliminary pathology after bronchoscopy showed  small cell lung cancer.   The patient was seen in consultation by Dr. Gustavo Miller from the Oncology Service. The patient was scheduled for  short-term outpatient followup and then institution of therapy once the  final pathology was noted. In addition, the patient did have an MRI of  his brain which showed a very small brain metastatic lesion and he would  need outpatient followup for that as well. He did receive IV steroids  in the hospital but it was not felt to be a large enough lesion to  require ongoing steroid taper at discharge. 2.  Hypercalcemia related to malignancy. The patient did have good  response to IV hydration as well as two doses zoledronic acid. PTH was  low as expected and a PTHrP was sent as well. This was still pending at  the time of discharge. Calcium had normalized on the day of discharge  and he was to follow up with Dr. Mahesh Che as noted above. 3.  Hypertension. The patient had some mild hypertension and losartan  was started. This will be followed up as an outpatient. 4.  Coronary artery disease. The patient remained asymptomatic and he  was to continue on statin therapy and beta-blocker therapy. Aspirin was  to be restarted at discharge after his bronchoscopy. 5.  COPD. This was stable throughout his hospitalization. Please see the discharge med rec form for discharge medications. Followup was to be with Dr. Mahesh Che and Dr. Derek Gao. Condition on  discharge was guarded. Please see the discharge med rec form for  discharge medications.         Oz Carvalho MD    D: 11/29/2021 15:49:46       T: 11/29/2021 15:52:26     [email or web address]  Job#: [id number]     Doc#: [id number]    CC:

## 2021-12-02 ENCOUNTER — CARE COORDINATION (OUTPATIENT)
Dept: CASE MANAGEMENT | Age: 76
End: 2021-12-02

## 2021-12-02 RX ORDER — FENTANYL 50 UG/H
PATCH TRANSDERMAL
COMMUNITY
Start: 2021-11-26

## 2021-12-02 NOTE — CARE COORDINATION
[facility] Transitions Follow Up Call    2021    Patient: Margaret Pickering  Patient : 1945   MRN: [id number]  Reason for Admission: lung cancer  Discharge Date: 21 RARS: Readmission Risk Score: 13.4 ( )         Spoke with: wife, Grant Valenzuela, HIPAA verified    Care Transitions Follow Up Call    Needs to be reviewed by the provider   Additional needs identified to be addressed with provider: No  none             Method of communication with provider : phone      Care Transition Nurse (CTN) contacted the family by telephone to follow up after admission. Verified name and  with family as identifiers. Addressed changes since last contact: none  Discussed follow-up appointments. If no appointment was previously scheduled, appointment scheduling offered: Yes. Is follow up appointment scheduled within 7 days of discharge? Yes. Advance Care Planning:   Does patient have an Advance Directive: decision maker updated. Advance Care Planning   Healthcare Decision Maker:    Primary Decision Maker: Magdalena Angulo [facility] - [phone]    CTN reviewed discharge instructions, medical action plan and red flags with family and discussed any barriers to care and/or understanding of plan of care after discharge. Discussed appropriate site of care based on symptoms and resources available to patient including: PCP, Specialist and Home health. The family agrees to contact the PCP office for questions related to their healthcare. Patients top risk factors for readmission: medical condition-lung cancer    Spoke with wife, Grant Valenzuela, HIPAA verified. Wife verified patient  and was pleasant and agreeable to transition calls. States patient is fatigued, has low energy. Appetite diminished. Patient was initially constipated but is now experiencing diarrhea. Feels it is possibly related to overmedicating for constipation. Plans to cut back on laxative use. Stomach burning.  States oncology aware of all issues as they had appt today. Patient was admitted to [facility] 11/22-11/25 for cancer treatment. HC to begin soon with [facility]. Denies N/V. States patient is on fentanyl patches, oxycodone, and is taking mirtazapine. Return to [facility] for further oncology visits. Denies any acute needs at present time. Agreeable to f/u calls. Educated on the use of urgent care or physicians 24 hr access line if assistance is needed after hours. CTN provided contact information for future needs. Plan for follow-up call in 7-10 days based on severity of symptoms and risk factors. Cortney Hampton LPN [address] [place] [address]  Care Transitions  [phone]    Care Transitions Subsequent and Final Call    Subsequent and Final Calls  Do you have any ongoing symptoms?: No  Have your medications changed?: Yes  Do you have any questions related to your medications?: No  Do you currently have any active services?: Yes  Are you currently active with any services?: Home Health  Do you have any needs or concerns that I can assist you with?: No  Identified Barriers: None  Care Transitions Interventions  Other Interventions: Follow Up  No future appointments.     Dereck Treviño LPN

## 2021-12-15 ENCOUNTER — CARE COORDINATION (OUTPATIENT)
Dept: CASE MANAGEMENT | Age: 76
End: 2021-12-15

## 2021-12-15 NOTE — CARE COORDINATION
[facility] Transitions Follow Up Call    12/15/2021    Patient: Laci Castaneda  Patient : 1945   MRN: <[id number]>  Reason for Admission: lung cancer  Discharge Date: 21 RARS: Readmission Risk Score: 13.4 ( )      Spoke with: Pierce Veras patient wife and patient was in the background. Patient had 2nd round of chemo today. Patient states that he is very fatigued and no appetite. The doctor is aware. Writer advised to continue to monitor for Covid 19 s/s and report any abnormal symptoms to doctor immediately. Denies any needs at this time. Reminded of COVID 19 precautions. Agreeable to f/u calls. Care Transitions Subsequent and Final Call    Subsequent and Final Calls  Care Transitions Interventions  Other Interventions: Follow Up  No future appointments.     Miki Slater LPN

## 2021-12-20 LAB
FUNGUS (MYCOLOGY) CULTURE: NORMAL
FUNGUS STAIN: NORMAL

## 2021-12-29 ENCOUNTER — OFFICE VISIT (OUTPATIENT)
Dept: PRIMARY CARE CLINIC | Age: 76
End: 2021-12-29

## 2021-12-29 DIAGNOSIS — R05.9 COUGH: Primary | ICD-10-CM

## 2021-12-29 PROBLEM — U07.1 COVID-19: Status: ACTIVE | Noted: 2021-12-29

## 2021-12-29 PROCEDURE — 99999 PR OFFICE/OUTPT VISIT,PROCEDURE ONLY: CPT | Performed by: NURSE PRACTITIONER

## 2021-12-29 RX ORDER — SODIUM CHLORIDE 9 MG/ML
INJECTION, SOLUTION INTRAVENOUS CONTINUOUS
OUTPATIENT
Start: 2021-12-29

## 2021-12-29 RX ORDER — SODIUM CHLORIDE 9 MG/ML
25 INJECTION, SOLUTION INTRAVENOUS PRN
OUTPATIENT
Start: 2021-12-29

## 2021-12-29 RX ORDER — EPINEPHRINE 1 MG/ML
0.3 INJECTION, SOLUTION, CONCENTRATE INTRAVENOUS PRN
OUTPATIENT
Start: 2021-12-29

## 2021-12-29 RX ORDER — ACETAMINOPHEN 325 MG/1
650 TABLET ORAL
OUTPATIENT
Start: 2021-12-29

## 2021-12-29 RX ORDER — HEPARIN SODIUM (PORCINE) LOCK FLUSH IV SOLN 100 UNIT/ML 100 UNIT/ML
500 SOLUTION INTRAVENOUS PRN
OUTPATIENT
Start: 2021-12-29

## 2021-12-29 RX ORDER — DIPHENHYDRAMINE HYDROCHLORIDE 50 MG/ML
50 INJECTION INTRAMUSCULAR; INTRAVENOUS
OUTPATIENT
Start: 2021-12-29

## 2021-12-29 RX ORDER — ALBUTEROL SULFATE 90 UG/1
4 AEROSOL, METERED RESPIRATORY (INHALATION) PRN
OUTPATIENT
Start: 2021-12-29

## 2021-12-29 RX ORDER — ONDANSETRON 2 MG/ML
8 INJECTION INTRAMUSCULAR; INTRAVENOUS
OUTPATIENT
Start: 2021-12-29

## 2021-12-30 ENCOUNTER — HOSPITAL ENCOUNTER (OUTPATIENT)
Dept: INFUSION THERAPY | Age: 76
Setting detail: INFUSION SERIES
Discharge: HOME | End: 2021-12-30
Payer: MEDICARE

## 2021-12-30 VITALS
HEART RATE: 68 BPM | DIASTOLIC BLOOD PRESSURE: 69 MMHG | OXYGEN SATURATION: 98 % | TEMPERATURE: 98.6 F | RESPIRATION RATE: 20 BRPM | SYSTOLIC BLOOD PRESSURE: 121 MMHG

## 2021-12-30 DIAGNOSIS — U07.1 COVID-19: Primary | ICD-10-CM

## 2021-12-30 LAB — SARS-COV-2: DETECTED

## 2021-12-30 PROCEDURE — 2580000003 HC RX 258: Performed by: STUDENT IN AN ORGANIZED HEALTH CARE EDUCATION/TRAINING PROGRAM

## 2021-12-30 PROCEDURE — M0245 HC IV INFUSION BAMLANIVIMAB & ETESEVIMAB W/MONITORING: HCPCS

## 2021-12-30 RX ORDER — ALBUTEROL SULFATE 90 UG/1
4 AEROSOL, METERED RESPIRATORY (INHALATION) PRN
OUTPATIENT
Start: 2021-12-30

## 2021-12-30 RX ORDER — SODIUM CHLORIDE 9 MG/ML
INJECTION, SOLUTION INTRAVENOUS CONTINUOUS
OUTPATIENT
Start: 2021-12-30

## 2021-12-30 RX ORDER — EPINEPHRINE 1 MG/ML
0.3 INJECTION, SOLUTION, CONCENTRATE INTRAVENOUS PRN
OUTPATIENT
Start: 2021-12-30

## 2021-12-30 RX ORDER — SODIUM CHLORIDE 9 MG/ML
25 INJECTION, SOLUTION INTRAVENOUS PRN
OUTPATIENT
Start: 2021-12-30

## 2021-12-30 RX ORDER — HEPARIN SODIUM (PORCINE) LOCK FLUSH IV SOLN 100 UNIT/ML 100 UNIT/ML
500 SOLUTION INTRAVENOUS PRN
OUTPATIENT
Start: 2021-12-30

## 2021-12-30 RX ORDER — SODIUM CHLORIDE 0.9 % (FLUSH) 0.9 %
5-40 SYRINGE (ML) INJECTION PRN
Status: CANCELLED | OUTPATIENT
Start: 2021-12-30

## 2021-12-30 RX ORDER — ACETAMINOPHEN 325 MG/1
650 TABLET ORAL
OUTPATIENT
Start: 2021-12-30

## 2021-12-30 RX ORDER — SODIUM CHLORIDE 0.9 % (FLUSH) 0.9 %
5-40 SYRINGE (ML) INJECTION PRN
Status: DISCONTINUED | OUTPATIENT
Start: 2021-12-30 | End: 2021-12-31 | Stop reason: HOSPADM

## 2021-12-30 RX ORDER — DIPHENHYDRAMINE HYDROCHLORIDE 50 MG/ML
50 INJECTION INTRAMUSCULAR; INTRAVENOUS
OUTPATIENT
Start: 2021-12-30

## 2021-12-30 RX ORDER — ONDANSETRON 2 MG/ML
8 INJECTION INTRAMUSCULAR; INTRAVENOUS
OUTPATIENT
Start: 2021-12-30

## 2021-12-30 RX ADMIN — Medication 10 ML: at 14:55

## 2021-12-30 RX ADMIN — Medication 10 ML: at 15:44

## 2021-12-30 NOTE — PROGRESS NOTES
documented on flowsheet:    Patient monitored for 1 hour post infusion. VSS and no complaints. Patient has been given a copy of Fact Sheet: Yes      Response to treatment:  Well tolerated by patient.       Electronically signed by Marylen Golds, RN on 12/30/2021 at 3:25 PM

## 2022-01-04 LAB
AFB CULTURE (MYCOBACTERIA): NORMAL
AFB SMEAR: NORMAL

## 2022-01-06 ENCOUNTER — CARE COORDINATION (OUTPATIENT)
Dept: CASE MANAGEMENT | Age: 77
End: 2022-01-06

## 2022-01-06 NOTE — CARE COORDINATION
[facility] Transitions Follow Up Call    2022    Patient: Twylla Harada  Patient : 1945   MRN: [id number]  Reason for Admission: Covid  Discharge Date: 21 RARS: Readmission Risk Score: 13.4 ( )         Spoke with: Diane Transitions Subsequent and Final Call    Subsequent and Final Calls  Do you have any ongoing symptoms?: Yes  Patient-reported symptoms: Constipation, Other  Have your medications changed?: No  Do you have any questions related to your medications?: No  Do you currently have any active services?: No  Are you currently active with any services?: Home Health  Do you have any needs or concerns that I can assist you with?: No  Identified Barriers: None  Care Transitions Interventions   Home Care Waiver: Completed     Other Interventions: Follow Up  No future appointments. Pt reports to be doing ok today. Denies any fever, chills, cp or sob. States he has no appetite but has an appointment for Chemo Monday and will discuss with his oncologist then. States he no longer has diarrhea but is now back to being constipated. He lacks energy throughout the day. HC is no longer active with pt. Pt states they only came once due to him in and out of the hospital, and with covid. He has no questions concerns or needs at this time. Encouraged pt to reach out to his PCP or oncologist for anything further and told him this would be a final call. Will resolve episode and remain available.     SHIRLENE AbdullahiN, RN   Care Transition Nurse  Mobile: [phone]

## (undated) DEVICE — NEBULIZER,KIT,T-MOUTHPIECE,6"RESER,7'TUB: Brand: MEDLINE

## (undated) DEVICE — MASK, AEROSOL, ELONGATED, ADULT: Brand: MEDLINE

## (undated) DEVICE — Device: Brand: BALLOON

## (undated) DEVICE — FORCEPS BX L100CM JAW DIA1.8MM CHN 2MM PULM W/ NDL DISP

## (undated) DEVICE — SINGLE USE SUCTION VALVE MAJ-209: Brand: SINGLE USE SUCTION VALVE (STERILE)

## (undated) DEVICE — 60 ML SYRINGE REGULAR TIP: Brand: MONOJECT

## (undated) DEVICE — NEEDLE 25GA VIZISHOT 2

## (undated) DEVICE — SINGLE USE BIOPSY VALVE MAJ-210: Brand: SINGLE USE BIOPSY VALVE (STERILE)

## (undated) DEVICE — SYRINGE MED 10ML POLYPR LUERSLIP TIP FLAT TOP W/O SFTY DISP

## (undated) DEVICE — MAJ-1414 SINGLE USE ADPATER BIOPSY VALV: Brand: SINGLE USE ADAPTOR BIOPSY VALVE